# Patient Record
Sex: MALE | Race: WHITE | NOT HISPANIC OR LATINO | Employment: STUDENT | URBAN - METROPOLITAN AREA
[De-identification: names, ages, dates, MRNs, and addresses within clinical notes are randomized per-mention and may not be internally consistent; named-entity substitution may affect disease eponyms.]

---

## 2017-07-24 DIAGNOSIS — Z00.00 ROUTINE GENERAL MEDICAL EXAMINATION AT A HEALTH CARE FACILITY: Primary | ICD-10-CM

## 2017-08-09 ENCOUNTER — CLINICAL SUPPORT (OUTPATIENT)
Dept: INTERNAL MEDICINE | Facility: CLINIC | Age: 27
End: 2017-08-09
Attending: INTERNAL MEDICINE

## 2017-08-09 ENCOUNTER — HOSPITAL ENCOUNTER (OUTPATIENT)
Dept: CARDIOLOGY | Facility: CLINIC | Age: 27
Discharge: HOME | End: 2017-08-09

## 2017-08-09 ENCOUNTER — OFFICE VISIT (OUTPATIENT)
Dept: PULMONOLOGY | Facility: CLINIC | Age: 27
End: 2017-08-09

## 2017-08-09 ENCOUNTER — CLINICAL SUPPORT (OUTPATIENT)
Dept: INTERNAL MEDICINE | Facility: CLINIC | Age: 27
End: 2017-08-09

## 2017-08-09 ENCOUNTER — HOSPITAL ENCOUNTER (OUTPATIENT)
Dept: RADIOLOGY | Facility: HOSPITAL | Age: 27
Discharge: HOME | End: 2017-08-09
Attending: INTERNAL MEDICINE

## 2017-08-09 VITALS
SYSTOLIC BLOOD PRESSURE: 119 MMHG | WEIGHT: 250 LBS | BODY MASS INDEX: 33.13 KG/M2 | RESPIRATION RATE: 12 BRPM | DIASTOLIC BLOOD PRESSURE: 80 MMHG | HEART RATE: 76 BPM | HEIGHT: 73 IN

## 2017-08-09 DIAGNOSIS — Z00.00 ANNUAL PHYSICAL EXAM: Primary | ICD-10-CM

## 2017-08-09 DIAGNOSIS — Z00.00 ROUTINE GENERAL MEDICAL EXAMINATION AT A HEALTH CARE FACILITY: ICD-10-CM

## 2017-08-09 DIAGNOSIS — Z00.00 ROUTINE GENERAL MEDICAL EXAMINATION AT A HEALTH CARE FACILITY: Primary | ICD-10-CM

## 2017-08-09 LAB
ALBUMIN SERPL BCP-MCNC: 3.9 G/DL
ALP SERPL-CCNC: 81 U/L
ALT SERPL W/O P-5'-P-CCNC: 32 U/L
ANION GAP SERPL CALC-SCNC: 9 MMOL/L
AST SERPL-CCNC: 19 U/L
BILIRUB SERPL-MCNC: 1.1 MG/DL
BUN SERPL-MCNC: 15 MG/DL
CALCIUM SERPL-MCNC: 9.8 MG/DL
CHLORIDE SERPL-SCNC: 106 MMOL/L
CHOLEST/HDLC SERPL: 2.9 {RATIO}
CO2 SERPL-SCNC: 27 MMOL/L
CREAT SERPL-MCNC: 1 MG/DL
ERYTHROCYTE [DISTWIDTH] IN BLOOD BY AUTOMATED COUNT: 12.6 %
EST. GFR  (AFRICAN AMERICAN): >60 ML/MIN/1.73 M^2
EST. GFR  (NON AFRICAN AMERICAN): >60 ML/MIN/1.73 M^2
ESTIMATED AVG GLUCOSE: 100 MG/DL
GLUCOSE SERPL-MCNC: 88 MG/DL
HBA1C MFR BLD HPLC: 5.1 %
HCT VFR BLD AUTO: 43.7 %
HDL/CHOLESTEROL RATIO: 34.6 %
HDLC SERPL-MCNC: 162 MG/DL
HDLC SERPL-MCNC: 56 MG/DL
HGB BLD-MCNC: 14.7 G/DL
LDLC SERPL CALC-MCNC: 93.6 MG/DL
MCH RBC QN AUTO: 29.9 PG
MCHC RBC AUTO-ENTMCNC: 33.6 G/DL
MCV RBC AUTO: 89 FL
NONHDLC SERPL-MCNC: 106 MG/DL
PLATELET # BLD AUTO: 273 K/UL
PMV BLD AUTO: 10.5 FL
POTASSIUM SERPL-SCNC: 4.5 MMOL/L
PROT SERPL-MCNC: 7.2 G/DL
RBC # BLD AUTO: 4.91 M/UL
SODIUM SERPL-SCNC: 142 MMOL/L
TRIGL SERPL-MCNC: 62 MG/DL
WBC # BLD AUTO: 7.69 K/UL

## 2017-08-09 PROCEDURE — 71020 XR CHEST PA AND LATERAL: CPT | Mod: 26,,, | Performed by: RADIOLOGY

## 2017-08-09 PROCEDURE — 97802 MEDICAL NUTRITION INDIV IN: CPT | Mod: S$GLB,,, | Performed by: INTERNAL MEDICINE

## 2017-08-09 PROCEDURE — 93000 ELECTROCARDIOGRAM COMPLETE: CPT | Mod: ,,, | Performed by: INTERNAL MEDICINE

## 2017-08-09 PROCEDURE — 97750 PHYSICAL PERFORMANCE TEST: CPT | Mod: S$GLB,,, | Performed by: INTERNAL MEDICINE

## 2017-08-09 PROCEDURE — 99385 PREV VISIT NEW AGE 18-39: CPT | Mod: ,,, | Performed by: INTERNAL MEDICINE

## 2017-08-09 PROCEDURE — 71020 XR CHEST PA AND LATERAL: CPT | Mod: TC

## 2017-08-09 PROCEDURE — 80061 LIPID PANEL: CPT

## 2017-08-09 PROCEDURE — 36415 COLL VENOUS BLD VENIPUNCTURE: CPT

## 2017-08-09 PROCEDURE — 85027 COMPLETE CBC AUTOMATED: CPT

## 2017-08-09 PROCEDURE — 99999 PR PBB SHADOW E&M-EST. PATIENT-LVL III: CPT | Mod: PBBFAC,,, | Performed by: INTERNAL MEDICINE

## 2017-08-09 PROCEDURE — 83036 HEMOGLOBIN GLYCOSYLATED A1C: CPT

## 2017-08-09 PROCEDURE — 80053 COMPREHEN METABOLIC PANEL: CPT

## 2017-08-09 NOTE — PROGRESS NOTES
Subjective:       Patient ID: Bethel Rodrigues is a 27 y.o. male.    Chief Complaint: Annual Exam    HPI 26 yo dependent of a [employer] employee comes for his periodic health exam. He has recently graduated form [employer] in biology and is going out to find a job. He has no medical issues at this time.  Review of Systems   Constitutional: Negative.    HENT: Negative.    Eyes: Negative.    Respiratory: Negative.    Cardiovascular: Negative.    Gastrointestinal: Negative.    Genitourinary: Negative.    Musculoskeletal: Negative.    Skin: Negative.    Neurological: Negative.    Psychiatric/Behavioral: Negative.    All other systems reviewed and are negative.      Objective:      Physical Exam   Constitutional: He is oriented to person, place, and time. He appears well-developed and well-nourished.   HENT:   Head: Normocephalic and atraumatic.   Right Ear: External ear normal.   Left Ear: External ear normal.   Eyes: Conjunctivae and EOM are normal. Pupils are equal, round, and reactive to light.   Neck: Normal range of motion. Neck supple.   Cardiovascular: Normal rate, regular rhythm and normal heart sounds.    Pulmonary/Chest: Effort normal and breath sounds normal.   Peak flow 600 l/min   Abdominal: Soft. Bowel sounds are normal.   Musculoskeletal: Normal range of motion.   Neurological: He is alert and oriented to person, place, and time. He has normal reflexes.   Skin: Skin is warm and dry.   Psychiatric: He has a normal mood and affect. His behavior is normal. Judgment and thought content normal.       Assessment:       No diagnosis found.    Plan:         Labs: All parameters are normal. Chest x-ray is clear and EKG is normal. IMP: Healthy Male.

## 2017-08-09 NOTE — LETTER
August 9, 2017    Bethel Rodrigues  [address]  [place] [address]             [facility]  [address]  [place] [address]  Phone: [phone] Dear Bethel,      Thank you for allowing me to serve you and perform your Executive Health exam on 8/9/2017. This letter will serve as a brief summary of the physical findings and laboratory/studies performed and recommendations at this time. Today's assessment is normal except for your weight. You need to address that issue now. Good luck with the job search.         If you have any questions or concerns, please don't hesitate to call.    Sincerely,        Leonard Lopez MD

## 2017-08-09 NOTE — PROGRESS NOTES
Nutrition Assessment  Client name:  Bethel Rodrigues  :  1990  Age:  27 y.o.  Gender:  male    Client states:  His dad works for [employer] and his dad 's mom has history of elevated cholesterol. He recently graduated from [employer] in Biology, has moved back home with his Dad, took several vacations to celebrate and will begin his job search to work in a hospital lab or marine work--will wait and see what are the opportunities. He is a smoker fueled by study stress, but only has had 2 cigarettes of late, as stress level is quite less. He enjoys cooking and will research recipes on line and prepares pork chops, Italian entrees and ortiz. Presently he does not exercise, although has a gym membership, but would like to get into a exercise routine. When dining out he frequents [employer] bread, and [employer] for burritos. His goal for next year is to improve his cardio healthy and muscle strength.    No past medical history on file.    Social History    Marital status:  Single  Employment:  Graduated in Biology from [employer] - Dad works for [employer]    Social History   Substance Use Topics    Smoking status: Light Tobacco Smoker    Smokeless tobacco: Never Used    Alcohol use Yes      Comment: occasional, beer or whiskey 8 per wk        Current medications:  currently has no medications in their medication list.  Vitamins, minerals, and/or supplements:  Melatonin infrequently   Food allergies or intolerances:  none     Food History  Breakfast:  [employer] coffee 3c. With cream  Mid-morning Snack:  none  Lunch:  [place], sánchez, avocado sandwich from [employer] or meat/cheese sandwich on wheat bread  Mid-afternoon Snack:  none  Dinner:  Large Burrito with usual sides  H.S. Snack:  Cookies or brownies or 2nd portion of dinner foods    Exercise History:  No formal exercise, has gym memberships    Lab Reports   Total Cholesterol:  162    Triglycerides:  62  HDL:  56  LDL:  93.6   Glucose:   "88  HbA1c:  5.1  BP:  130/82     Weight History  Height:  6'1"     Weight:  250  BMI:  33.05  % Body Fat:  28.43    Diagnosis  RMR (Method:  Body McPherson):  2090 kcal  Activity Factor:  1.3  SHOAIB:  2717    Obesity related to inadequate physical activity as evidenced by BMI: 250 and Body fat 28.43%.    Intervention    Goals:  1.  Exercise begin 3x/wk, M/W/F advance to 5x/wk -- aerobic and resistance training 30 minutes each  2.  Healthy choices when dining out 25% - 50%  3.  Continue with healthy labs  4.  Body fat < 28.43  5.  Improve cardio and muscular health    Discussed the benefits of exercise to achieve stated goals. Explained options for healthy salty and sweet snacks to replace cookies. Discussed easy breakfast options, healthy selections for Fast Food and restaurant dining and the EAT Fit EMRE mobile sherrell and healthy shopping list to use as resources.    Handouts provided:  Meal Planning Guide  Restaurant Guide  Eat Fit Shopping List  Eat Fit Emre  Fast Food Guide  Vitamin/Mineral Guide    Monitoring/Evaluation    Monitor the following:  Weight  BMI  % Body Fat  Caloric intake  Labs:  CMP/Lipids    Follow Up Plan:  Follow up with client in 1-2 years  "

## 2017-08-10 NOTE — PROGRESS NOTES
Subjective:       Patient ID: Bethel Rodrigues is a 27 y.o. male.    Chief Complaint: No chief complaint on file.    HPI   Mr. Rodrigues has reported no previous history of cardiovascular or pulmonary disease. He has reported that he quit smoking 1 month ago. He has reported that his right shoulder was hurt during a skiing trip in January and the client requested that the push up functional test be deferred.    Mr. Rodrigues has just graduated with a degree in biology and is currently in the process of looking for a job.    Current Exercise Routine:   - None    Review of Systems    Objective:      The fitness evaluation results are as follows:    D.O.S. 8/9/2017   Height (in): 73   Weight (lbs): 250   BMI: 33.38575   Body Fat (%): 28.43   Waist (cm): 106   Hip (cm): 123   WHR: 0.86   RBP (mmHg): 130/82   RHR (bpm): 62    Strength R (lbs)t: 71.36306    Strength Lt (lbs): 61.52994   Push-up Assessment: Deferred   Curl-up Assessment: 29   Flexibility Testing (cm): 32   REE (kcals): 2090     Physical Exam    Assessment:      Age/Gender Stratified Assessment:     Resting BP: Within Testing Limits   Body Fat %: Poor   WHR Risk Factor: Low Risk    Strength R: Below Average    Strength L: Below Average   Upper Body Endurance: Deferred   Abdominal Endurance: Average   Lower body Flexibility: Good     1. Routine general medical examination at a health care facility        Plan:    Mr. Rodrigues should begin a regular exercise program now that time is available. He should focus on reaching below guidelines in order to increase fitness level. The routine should start out slow and gradually build over time.    Recommended Fitness Guidelines:   - 150 minutes of moderate intensity aerobic exercise each week   - 2-4 days per week with resistance training   - Daily stretching

## 2017-09-19 ENCOUNTER — OFFICE VISIT (OUTPATIENT)
Dept: ORTHOPEDICS | Facility: CLINIC | Age: 27
End: 2017-09-19
Payer: COMMERCIAL

## 2017-09-19 ENCOUNTER — HOSPITAL ENCOUNTER (OUTPATIENT)
Dept: RADIOLOGY | Facility: HOSPITAL | Age: 27
Discharge: HOME | End: 2017-09-19
Attending: PHYSICIAN ASSISTANT
Payer: COMMERCIAL

## 2017-09-19 VITALS
BODY MASS INDEX: 33.38 KG/M2 | SYSTOLIC BLOOD PRESSURE: 114 MMHG | DIASTOLIC BLOOD PRESSURE: 74 MMHG | WEIGHT: 251.88 LBS | HEIGHT: 73 IN | HEART RATE: 67 BPM

## 2017-09-19 DIAGNOSIS — R52 PAIN: ICD-10-CM

## 2017-09-19 DIAGNOSIS — M25.511 ACUTE PAIN OF RIGHT SHOULDER: ICD-10-CM

## 2017-09-19 DIAGNOSIS — R52 PAIN: Primary | ICD-10-CM

## 2017-09-19 PROCEDURE — 99999 PR PBB SHADOW E&M-EST. PATIENT-LVL III: CPT | Mod: PBBFAC,,, | Performed by: PHYSICIAN ASSISTANT

## 2017-09-19 PROCEDURE — 73030 X-RAY EXAM OF SHOULDER: CPT | Mod: 26,RT,, | Performed by: RADIOLOGY

## 2017-09-19 PROCEDURE — 3008F BODY MASS INDEX DOCD: CPT | Mod: S$GLB,,, | Performed by: PHYSICIAN ASSISTANT

## 2017-09-19 PROCEDURE — 73030 X-RAY EXAM OF SHOULDER: CPT | Mod: TC,RT

## 2017-09-19 PROCEDURE — 99203 OFFICE O/P NEW LOW 30 MIN: CPT | Mod: S$GLB,,, | Performed by: PHYSICIAN ASSISTANT

## 2017-09-19 RX ORDER — MELOXICAM 15 MG/1
15 TABLET ORAL DAILY
Qty: 30 TABLET | Refills: 1 | Status: SHIPPED | OUTPATIENT
Start: 2017-09-19 | End: 2017-09-19 | Stop reason: SDUPTHER

## 2017-09-19 RX ORDER — MELOXICAM 15 MG/1
15 TABLET ORAL DAILY
Qty: 30 TABLET | Refills: 1 | Status: SHIPPED | OUTPATIENT
Start: 2017-09-19 | End: 2017-10-19

## 2017-09-19 NOTE — PROGRESS NOTES
SUBJECTIVE:     Chief Complaint & History of Present Illness:  Bethel Rodrigues is a  New  patient 27 y.o. male who is seen here today with a complaint of    Chief Complaint   Patient presents with    Right Shoulder - Pain    .  He reports developed sudden onset pain in his right shoulder following a skiing accident about 3 months ago and landed forward onto his shoulder has any problems with soreness and pain in the anterior aspect of the shoulder since the time of injury.  Has treated conservatively since that time and received 75% relief.  But had an incident last week throwing a ball and has had a return of pain and sensations of instability in the shoulder  On a scale of 1-10, with 10 being worst pain imaginable, he rates this pain as 2 on good days and 5 on bad days.  he describes the pain as tender and sore.    Review of patient's allergies indicates:   Allergen Reactions    No known drug allergies          Current Outpatient Prescriptions   Medication Sig Dispense Refill    meloxicam (MOBIC) 15 MG tablet Take 1 tablet (15 mg total) by mouth once daily. 30 tablet 1     No current facility-administered medications for this visit.        History reviewed. No pertinent past medical history.    Past Surgical History:   Procedure Laterality Date    HERNIA REPAIR         Vital Signs (Most Recent)  Vitals:    09/19/17 1401   BP: 114/74   Pulse: 67       Review of Systems:  ROS:  Constitutional: no fever or chills  Eyes: no visual changes  ENT: no nasal congestion or sore throat  Respiratory: no cough or shortness of breath  Cardiovascular: no chest pain or palpitations  Gastrointestinal: no nausea or vomiting, tolerating diet  Genitourinary: no hematuria or dysuria  Integument/Breast: no rash or pruritis  Hematologic/Lymphatic: no easy bruising or lymphadenopathy  Musculoskeletal: no arthralgias or myalgias  Neurological: no seizures or tremors  Behavioral/Psych: no auditory or visual hallucinations  Endocrine: no  "heat or cold intolerance      OBJECTIVE:     PHYSICAL EXAM:  Height: 6' 1" (185.4 cm) Weight: 114.3 kg (251 lb 14 oz), General Appearance: Well nourished, well developed, in no acute distress.  Neurological: Mood & affect are normal.  Shoulder exam: right  Tenderness: AC joint, lateral acromial  ROM: forward flexion 180/180, extension 45/45, full abduction 180/180, abduction-glenohumeral 90/90, external rotation 50/50, pain at the extremes of mobility  Shoulder Strength: biceps 5/5, triceps 5/5, abduction 5/5, adduction 5/5, external rotation 5/5 with shoulder at side, flexion 5/5, and extension 5/5  negative for tenderness about the glenohumeral joint, positive for tenderness over the acromioclavicular joint and negative for impingement sign  Stability tests: anterior apprehension test positive for pain only and posterior apprehension test negative  Special Tests:Cross-chest abduction: negative                     RADIOGRAPHS:  X-rays taken today films reviewed by me demonstrate no ends of fracture dislocation or about the shoulder joint spaces are well-maintained no advanced generative joint disease    ASSESSMENT/PLAN:     Plan: We discussed with the patient at length all the different treatment options available for his rightshoulder including anti-inflammatories, acetaminophen, rest, ice, Physical therapy to include strengthening exercise, occasional cortisone injections for temporary relief, arthroscopic surgical repair, and finally shoulder arthroplasty.   He will into conservative treatment  Meloxicam 15 mg daily with food ×10 days followed by when necessary  Physical therapy for strength range of motion shoulder  Follow-up in 3 weeks sooner symptoms dictate    "

## 2017-09-25 ENCOUNTER — CLINICAL SUPPORT (OUTPATIENT)
Dept: REHABILITATION | Facility: HOSPITAL | Age: 27
End: 2017-09-25
Attending: PHYSICIAN ASSISTANT
Payer: COMMERCIAL

## 2017-09-25 DIAGNOSIS — R29.898 WEAKNESS OF RIGHT UPPER EXTREMITY: ICD-10-CM

## 2017-09-25 DIAGNOSIS — M25.511 ACUTE PAIN OF RIGHT SHOULDER: ICD-10-CM

## 2017-09-25 DIAGNOSIS — M25.311 INSTABILITY OF RIGHT SHOULDER JOINT: ICD-10-CM

## 2017-09-25 PROCEDURE — 97161 PT EVAL LOW COMPLEX 20 MIN: CPT | Mod: PO

## 2017-09-25 NOTE — PLAN OF CARE
OUTPATIENT PHYSICAL THERAPY  PHYSICAL THERAPY EVALUATION    Name: Bethel Rodrigues  Clinic Number: [id number]    Evaluation Date: 09/25/2017  Visit #: 1 / 20   Authorization period Expiration: 12/31/2017   Plan of Care Expiration: 12/26/2017  Precautions: Standard     Diagnosis:   Encounter Diagnoses   Name Primary?    Weakness of right upper extremity     Acute pain of right shoulder     Instability of right shoulder joint      Physician: Sky Huffman PA*  Treatment Orders: PT Eval and Treat  No past medical history on file.  Current Outpatient Prescriptions   Medication Sig    meloxicam (MOBIC) 15 MG tablet Take 1 tablet (15 mg total) by mouth once daily.     No current facility-administered medications for this visit.      Review of patient's allergies indicates:   Allergen Reactions    No known drug allergies        History   Prior Therapy: Pt reports no prior PT or shoulder injury, pulled muscle in back with no treatment    Social History: Pt lives with roommates, no limitations in recreational or home environment   Previous functional status: Prior to injury pt was independent in all ADLs and recreational activities   Current functional status: Pt currently has no functional limitations, only pain with end range abduction and ER of R UE   Work: Not employed     Subjective   History of Present Illness: Pt reports to [facility] with complaints of R shoulder pain sustained from a skiing incident in January 2016. Pt reports skiing on green slope when he fell forward onto his face and UE in a reverse T position. Pt had severe R shoulder pain and managed independently following incident. Pt stated shoulder pain resolved almost completely after initial injury. Approximately 1 week ago pt was playing fetch with his dog and felt similar, sharp pain in his R shoulder while in the initial throwing phase. Pt stated pain is mostly felt in the lateral shoulder while performing end range combined abduction with ER.  "Pt stated sensation of shoulder "sliding around" while performing overhead throwing or performing horizontal adduction. Pt. denies history of minor or major trauma, motor vehicle accident, fall; past or present cancer; fever, chills, night sweats; unexplained weight change in past 3 months; recent infection; persistent night pain; saddle anesthesia, or changes in bowel/bladder function.    DOI: January 2016, acute fare up about a week ago   Imaging, bone scan films: negative   Pain: current 0/10, worst 5/10, best 0/10, Sharp, intermittent  Radicular symptoms: none noted   Aggravating factors: end range abduction and ER  Easing factors: Moving arm into different position   Pts goals: Bethel would like to decreased shoulder pain and return to PLOF.     Objective   Mental status: alert, interactive, oriented x3  Posture/ Alignment: Pt is right hand dominant. In standing, pt presented with forward head, rounded shoulders, increased thoracic kyphosis, and internally rotated upper extremities.      Movemnet analysis: Pt demonstrated anterior translation of humeral head during IR and delayed upward rotation of scapula with R UE elevation.     ROM:     AROM Right Left Comment Normal   Shoulder Flexion: 150 degrees 145 degrees  180 deg   Shoulder Abduction: 156 degrees 160 degrees  180 deg   Shoulder ER: 98 degrees 103 degrees  90 deg   Shoulder IR: 90 degrees 92 degrees  90 deg   *denotes pain    Strength:      Right Left Comment   Shoulder flexion: 4+/5 4+/5    Shoulder Abduction: 5/5 5/5    Shoulder ER: 4+/5 4+/5    Shoulder IR: 5/5 5/5    Lower Trap: 3+/5 3+/5    Middle Trap: 3+/5 3+/5     5/5 5/5        Special Tests:  - Neers: right positive  - Shell-Db: right positive  - Lift-off: right negative  - Drop Arm: right negative  - Full/Empty Can: right negative  - Crank Test: right negative  - Apprehension/Relocation: right positive  - Scapular Assistance Test: right negative  - Scapular Retraction Test: right " negative   - Load and Shift Test: Right Positive, increased anterior translation   - Leon's: negative     Palpation:   No edema or erythema noted in shoulder girdle. Pt demonstrated increased tone in B upper trapezius. No tenderness with palpation of R glenohumeral joint line, greater tuberosity, RTC muscle bellies.     Joint Play:  Hypomobile R glenohumeral joint in AP direction   Hypermobile R glenohumeral joint in PA direction  Normal mobility in inferior direction when compared with unaffected L UE       Pt/family was provided educational information, including: shoulder anatomy and biomechanics, PT evaluation findings, role of PT, goals for PT, scheduling - pt verbalized understanding.     TREATMENT   Time In: 2:02 PM  Time Out: 3:00    Discussed Plan of Care with patient: Yes      Bethel received 5 minutes of therapeutic exercises including:   ER walkouts 10 x 5 second hold   Prone scap retractions 10 x 5 second hold   Prone B extension x 10       Written Home Exercises Provided: yes   Exercises were reviewed and Bethel was able to demonstrate them prior to the end of the session. Pt received a written copy of exercises to perform at home. Bethel demonstrated good  understanding of the education provided.     Assessment   Bethel is a 27 y.o. male referred to outpatient physical therapy with a medical diagnosis of acute R shoulder pain due to an exacerbation from a previous injury. Pt demonstrates impaired shoulder mechanics, laxity, and decreased neuromuscular control of R shoulder girdle. Bethel is a good candidate for skilled therapy services to improve appropriate humeral head translation, shoulder stability via muscular control, and strengthening to R UE so he may return to reactional and functional activity without symptoms. Pt prognosis is Good. Positive prognostic factors include motivation for therapy, health status, age. Negative prognostic factors include time elapsed since initial injury. Pt will  benefit from skilled outpatient physical therapy to address the above stated deficits, provide pt/family education, and to maximize pt's level of independence.     History  Co-morbidities and personal factors that may impact the plan of care Examination  Body Structures and Functions, activity limitations and participation restrictions that may impact the plan of care    Clinical Presentation   Co-morbidities:   high BMI        Personal Factors:   no deficits Body Regions:   upper extremities    Body Systems:   strength  motor control        Participation Restrictions:   Bethel currently is able to participate full in functional and recreational activity with symptoms.      Activity limitations:   Learning and applying knowledge  no deficits    General Tasks and Commands  no deficits    Communication  no deficits    Mobility  no deficits    Self care  no deficits    Domestic Life  no deficits    Interactions/Relationships  no deficits    Life Areas  no deficits    Community and Social Life  no deficits         stable and uncomplicated                      low   low  low Decision Making/ Complexity Score:  low     Pt's spiritual, cultural and educational needs considered and pt agreeable to plan of care and goals as stated below:     Anticipated Barriers for physical therapy: none     Short Term GOALS:  In 2 weeks, pt. Will:  1. Pt will demonstrate proper scapular stability with AROM of R UE within available range in order to prevent abnormal motor patterns  2. Throw 5 overhand pitches with </= 3/10 pain to increased tolerance for recreational activity   3. Pt will be able to demonstrate proper upright posture without verbal cuing in order to optimize functioning with decreased structural stresses      Long Term GOALS:  In 5 weeks, pt. Will:  1. Increased MMT for R low and mid trap by 1 grade to increase scapular stability with functional activities  2. Throw 15 overhand pitches without pain to return to recreational  activity.   3. Be independent with HEP and SX management     Plan   Outpatient physical therapy 2 times weekly to include: pt ed, HEP, therapeutic exercises, therapeutic activities, neuromuscular re-education/ balance exercises, manual therapy, and modalities prn. Cont PT for 5 weeks. Pt may be seen by PTA as part of the rehabilitation team.   Sky Huffman PA-C  I certify the need for these services furnished under this plan of treatment and while under my care.    Skylar Barbosa, PT

## 2017-10-02 ENCOUNTER — DOCUMENTATION ONLY (OUTPATIENT)
Dept: REHABILITATION | Facility: HOSPITAL | Age: 27
End: 2017-10-02

## 2017-10-02 NOTE — PROGRESS NOTES
Patient was not present for visit on 10/02/2017 scheduled at 2:00 PM. This is the patient's first no show appointment.

## 2017-10-04 ENCOUNTER — DOCUMENTATION ONLY (OUTPATIENT)
Dept: REHABILITATION | Facility: HOSPITAL | Age: 27
End: 2017-10-04

## 2017-10-04 ENCOUNTER — TELEPHONE (OUTPATIENT)
Dept: REHABILITATION | Facility: HOSPITAL | Age: 27
End: 2017-10-04

## 2017-10-09 ENCOUNTER — CLINICAL SUPPORT (OUTPATIENT)
Dept: REHABILITATION | Facility: HOSPITAL | Age: 27
End: 2017-10-09
Attending: PHYSICIAN ASSISTANT
Payer: COMMERCIAL

## 2017-10-09 DIAGNOSIS — R29.898 WEAKNESS OF RIGHT UPPER EXTREMITY: ICD-10-CM

## 2017-10-09 DIAGNOSIS — M25.511 ACUTE PAIN OF RIGHT SHOULDER: ICD-10-CM

## 2017-10-09 DIAGNOSIS — M25.311 INSTABILITY OF RIGHT SHOULDER JOINT: ICD-10-CM

## 2017-10-09 PROCEDURE — 97110 THERAPEUTIC EXERCISES: CPT | Mod: PO

## 2017-10-11 ENCOUNTER — CLINICAL SUPPORT (OUTPATIENT)
Dept: REHABILITATION | Facility: HOSPITAL | Age: 27
End: 2017-10-11
Attending: PHYSICIAN ASSISTANT
Payer: COMMERCIAL

## 2017-10-11 DIAGNOSIS — M25.311 INSTABILITY OF RIGHT SHOULDER JOINT: ICD-10-CM

## 2017-10-11 DIAGNOSIS — R29.898 WEAKNESS OF RIGHT UPPER EXTREMITY: ICD-10-CM

## 2017-10-11 DIAGNOSIS — M25.511 ACUTE PAIN OF RIGHT SHOULDER: ICD-10-CM

## 2017-10-11 PROCEDURE — 97110 THERAPEUTIC EXERCISES: CPT | Mod: PO

## 2017-10-11 NOTE — PROGRESS NOTES
Name: Bethel Rodrigues  Clinic Number: [id number]  Date of Treatment: 10/11/2017   Diagnosis:   Encounter Diagnoses   Name Primary?    Weakness of right upper extremity     Acute pain of right shoulder     Instability of right shoulder joint        Physician: Sky Huffman PA*    Time in: 2:05 PM  Time Out: 2:55 PM  Total Treatment Time: 50  Last PN: NA  Date of eval: 09/25/2017  Visit #: 3/20  Auth expiration: 12/31/2017  POC expiration: 12/26/2017    Precautions: Standard     Subjective     Bethel reports his shoulder is acting up more than usual today. Pt stated no residual soreness after last treatment session, reports feeling better following. Pt reports their pain to be 0/10 on a 0-10 scale with 0 being no pain and 10 being the worst pain imaginable.    Objective     Bethel received therapeutic exercises to develop strength and endurance for 50 minutes including:     UBE x 6 minutes, switch direction every 2 minutes     Sidelying ER  1#  3x10  Sidelying Abd  2# 3 x10     Prone scap retraction 2 x 10, 5 second hold    Prone row 2 x 10 2# with scapular setting   Prone I Y T  X 10 with scapular retraction     Wall walks YTB 10' x 2 laps   Wall slides with YTB 2 x 15  Wall climbs  OTB x 15    Standing Scapula Retractions GTB 2 x 15  ER/IR Walkouts 2 x 10 with 5 second hold GTB    Ball on wall stabilization - CW/CCW, Up/down, M/L 2 x 30 seconds each     (next visit: D1 pattern with pulley)     Written Home Exercises Provided: No, pt instructed to continue with current HEP    Pt educated on new therapeutic exercies. Pt demo good understanding of the education provided. Bethel demonstrated good return demonstration of activities.     Assessment   Pt able to perform new therex without increase in symptoms. However, weight reduced with sidelying ER exercise due to complaints of sharp pain in anterior shoulder. Pt demonstrated muscle fatigue with stabilization exercises and wall climbs. Pt able to perform appropriate  scapular setting with minimal cueing prior to prone IYTs. Bethel participated in today's treatment session without symptom provocation or adverse effects. Pt will continue to benefit from skilled PT intervention. Medical Necessity is demonstrated by:  Pain limits function of effected part for some activities and Weakness.    Patient is making good progress towards established goals.    New/Revised goals: none at this time  Plan   Continue with established Plan of Care towards PT goals.     Skylar Barbosa, PT

## 2017-10-18 ENCOUNTER — CLINICAL SUPPORT (OUTPATIENT)
Dept: REHABILITATION | Facility: HOSPITAL | Age: 27
End: 2017-10-18
Attending: PHYSICIAN ASSISTANT
Payer: COMMERCIAL

## 2017-10-18 DIAGNOSIS — M25.511 ACUTE PAIN OF RIGHT SHOULDER: ICD-10-CM

## 2017-10-18 DIAGNOSIS — M25.311 INSTABILITY OF RIGHT SHOULDER JOINT: ICD-10-CM

## 2017-10-18 DIAGNOSIS — R29.898 WEAKNESS OF RIGHT UPPER EXTREMITY: ICD-10-CM

## 2017-10-18 PROCEDURE — 97110 THERAPEUTIC EXERCISES: CPT | Mod: PO

## 2017-10-18 PROCEDURE — 97140 MANUAL THERAPY 1/> REGIONS: CPT | Mod: PO

## 2017-10-18 NOTE — PROGRESS NOTES
Name: Bethel Rodrigues  Clinic Number: [id number]  Date of Treatment: 10/18/2017   Diagnosis:   Encounter Diagnoses   Name Primary?    Weakness of right upper extremity     Acute pain of right shoulder     Instability of right shoulder joint        Physician: Sky Huffman PA*    Time in: 2:00 PM  Time Out: 3:00 PM  Total Treatment Time: 60 minutes   Last PN: NA  Date of eval: 09/25/2017  Visit #: 4/20  Auth expiration: 12/31/2017  POC expiration: 12/26/2017    Precautions: Standard     Subjective     Bethel reports his shoulder is shoulder is feeling good today. Pt stated last week he was sore after treatment session, but it was symptoms resolved the following day. Pt stated should began to feel stronger last week, but occasionally experiences sensations of shoulder slipping forward while performing reaching activities. Pt reports their pain to be 0/10 on a 0-10 scale with 0 being no pain and 10 being the worst pain imaginable.    Objective     Bethel received therapeutic exercises to develop strength and endurance for 50 minutes including:     UBE x 6 minutes, switch direction every 2 minutes Level 1.5 resistance     Sidelying ER  2#  3x10  Sidelying Abd  2# 3 x10   Sidelying Shoulder Flexion 2# 2 x 10     Prone scap retraction 2 x 10, 5 second hold    Prone row 2 x 10 2# with scapular setting   Prone I Y T  X 10 with scapular retraction     Wall walks YTB 10' x 2 laps   Wall slides with OTB x10   Wall slides with OTB and lift at end x 10     Wall climbs  OTB x 15    Standing Scapula Retractions GTB 2 x 15  ER/IR Walkouts 2 x 10 with 5 second hold GTB    Ball on wall stabilization - CW/CCW, Up/down, M/L 2 x 30 seconds each     Sleeper Stretch 3 x 20 seconds   Scapular Wall Slides with stable trunk     (next visit: D1 pattern with pulley, sharapova)     Bethel received the following manual therapy techniques: Joint mobilizations and Soft tissue Mobilization were applied to the: R shoulder for 10 minutes.   Gr I  - II AP mobilizations to GH joint   IR stretch with posterior force through [place]    Written Home Exercises Provided: No, pt instructed to continue with current HEP    Pt educated on new therapeutic exercies. Pt demo good understanding of the education provided. Bethel demonstrated good return demonstration of activities.     Assessment   Pt demonstrating improved awareness of periscapular musculature and activation patterns. Pt able to tolerate progression of exercises including increased resistance and repetitions. Pt required tactile and verbal cueing to decrease shoulder girdle elevation and maintain parallel forearms during wall slides with lift offs. Bethel participated in today's treatment session without symptom provocation or adverse effects. Pt will continue to benefit from skilled PT intervention. Medical Necessity is demonstrated by:  Pain limits function of effected part for some activities and Weakness.    Patient is making good progress towards established goals.    New/Revised goals: none at this time  Plan   Continue with established Plan of Care towards PT goals.     Skylar Barbosa, PT

## 2017-10-23 ENCOUNTER — CLINICAL SUPPORT (OUTPATIENT)
Dept: REHABILITATION | Facility: HOSPITAL | Age: 27
End: 2017-10-23
Attending: PHYSICIAN ASSISTANT
Payer: COMMERCIAL

## 2017-10-23 DIAGNOSIS — M25.511 ACUTE PAIN OF RIGHT SHOULDER: ICD-10-CM

## 2017-10-23 DIAGNOSIS — M25.311 INSTABILITY OF RIGHT SHOULDER JOINT: ICD-10-CM

## 2017-10-23 DIAGNOSIS — R29.898 WEAKNESS OF RIGHT UPPER EXTREMITY: ICD-10-CM

## 2017-10-23 PROCEDURE — 97110 THERAPEUTIC EXERCISES: CPT | Mod: PO

## 2017-10-23 NOTE — PROGRESS NOTES
Name: Bethel Rodrigues  Clinic Number: [id number]  Date of Treatment: 10/23/2017   Diagnosis:   Encounter Diagnoses   Name Primary?    Weakness of right upper extremity     Acute pain of right shoulder     Instability of right shoulder joint        Physician: Sky Huffman PA*    Time in: 2:00 PM  Time Out: 2:50 PM  Total Treatment Time: 50 minutes   Last PN: NA  Date of eval: 09/25/2017  Visit #: 5/20  Auth expiration: 12/31/2017  POC expiration: 12/26/2017    Precautions: Standard     Subjective     Bethel reports his shoulder is shoulder is feeling good today. Pt denied any adverse effects following last treatment session.  Pt reports their pain to be 0/10 on a 0-10 scale with 0 being no pain and 10 being the worst pain imaginable.    Objective     Bethel received therapeutic exercises to develop strength and endurance for 50 minutes including:     UBE x 6 minutes, switch direction every 2 minutes Level 1.5 resistance     Sidelying ER  2#  3x10  Sidelying Abd  2# 3 x10   Sidelying Shoulder Flexion 2#  3 x 10     Prone scap retraction 2 x 10, 5 second hold  (NP)  Prone row 2 x 10 2# with scapular setting   Prone I Y T  X 15 with scapular retraction 1#    Wall walks OTB 10' x 2 laps   Wall slides with OTB x10 (NP)  Wall slides with OTB and lift at end x 15  Wall climbs  OTB x 15    Standing Scapula Retractions on cable cross 7# 2 x 10   ER/IR Walkouts 2 x 10 with 5 second hold GTB (NP)  D1 pattern on pulley 7# 2 x 10 with R LE step back   ER on cable cross 3# 2 x 10     Ball on wall stabilization - CW/CCW, Up/down, M/L 2 x 30 seconds each (NP)  Body Blade ML & AP 2 x 30 seconds   Overhead ball toss on rebounder with red ball x 20     Sleeper Stretch 3 x 20 seconds   Scapular Clocks with towel x 10     (next visit: standing punches at cable cross, shoulder extension)     Bethel received the following manual therapy techniques: Joint mobilizations and Soft tissue Mobilization were applied to the: R shoulder for 0  minutes. (NOT PERFORMED TODAY)   Gr I - II AP mobilizations to GH joint   IR stretch with posterior force through [place]    Written Home Exercises Provided: No, pt instructed to continue with current HEP    Pt educated on new therapeutic exercies. Pt demo good understanding of the education provided. Bethel demonstrated good return demonstration of activities.     Assessment   Pt able to tolerate progression of exercises to include increased resistance and activation of periscapular musculature. Pt able to complete overhead ball toss on rebounder without symptoms provocation. Pt required cueing for increased lower trapezius activation during wall lift offs. Pt tolerated body blade with minimal verbal cueing for technique and form. Bethel participated in today's treatment session without symptom provocation or adverse effects. Pt will continue to benefit from skilled PT intervention. Medical Necessity is demonstrated by:  Pain limits function of effected part for some activities and Weakness.    Patient is making good progress towards established goals.    New/Revised goals: none at this time  Plan   Continue with established Plan of Care towards PT goals. Re-evaluate next treatment session.     Skylar Barbosa, PT

## 2017-10-25 ENCOUNTER — CLINICAL SUPPORT (OUTPATIENT)
Dept: REHABILITATION | Facility: HOSPITAL | Age: 27
End: 2017-10-25
Attending: PHYSICIAN ASSISTANT
Payer: COMMERCIAL

## 2017-10-25 DIAGNOSIS — M25.511 ACUTE PAIN OF RIGHT SHOULDER: ICD-10-CM

## 2017-10-25 DIAGNOSIS — M25.311 INSTABILITY OF RIGHT SHOULDER JOINT: ICD-10-CM

## 2017-10-25 DIAGNOSIS — R29.898 WEAKNESS OF RIGHT UPPER EXTREMITY: ICD-10-CM

## 2017-10-25 PROCEDURE — 97164 PT RE-EVAL EST PLAN CARE: CPT | Mod: PO

## 2017-10-25 PROCEDURE — 97110 THERAPEUTIC EXERCISES: CPT | Mod: PO

## 2017-10-25 NOTE — PROGRESS NOTES
Name: Bethel Rodrigues  Clinic Number: [id number]  Date of Treatment: 10/25/2017   Diagnosis:   Encounter Diagnoses   Name Primary?    Weakness of right upper extremity     Acute pain of right shoulder     Instability of right shoulder joint        Physician: Sky Huffman PA*    Time in: 2:05 PM  Time Out: 3:05 PM  Total Treatment Time: 60 minutes   Last PN: NA  Date of eval: 09/25/2017  Visit #: 6/20  Auth expiration: 12/31/2017  POC expiration: 12/26/2017    Precautions: Standard     Subjective     Pt reports feeling more comfortable with overhead throwing motions with increased force. Pt reports occasional sharp pain in anterior lateral shoulder while performing activities involving reaching forward and down diagonally.  Pt reports their pain to be 0/10 on a 0-10 scale with 0 being no pain and 10 being the worst pain imaginable.    Objective     Strength:        Right Left Comment   Shoulder flexion: 5/5 4+/5     Shoulder Abduction: 5/5 5/5     Shoulder ER: 4+/5 NT     Shoulder IR: 5/5 NT     Lower Trap: 4/5 NT     Middle Trap: 4/5 NT      5/5 5/5           Special Tests:  - Neers: right negative  - Shell-Db: right negative  - Biceps Load I/II: right negative  - Crank Test: right positive  - Apprehension/Relocation: right negative     Palpation:   No TTP      Joint Play:  Normal mobility of R GH joint AP direction   Increased anterior translation with IR at 90 degrees abduction   Hypermobility in R GH joint in PA direction       Bethel received therapeutic exercises to develop strength and endurance for 45 minutes including:     UBE x 6 minutes, switch direction every 2 minutes Level 2 resistance     Sidelying ER  2#  3x10 (NP today)  Sidelying Abd  2# 3 x10 (NP today)  Sidelying Shoulder Flexion 2#  3 x 10 (NP today)    Prone scap retraction 2 x 10, 5 second hold  (NP)  Prone row 2 x 10 2# with scapular setting (NP today)  Prone I Y T  X 15 with scapular retraction 1#    Wall walks OTB 10' x 2  laps   Wall slides with OTB x10 (NP)  Wall slides with OTB and lift at end 2 x 10   Wall climbs  OTB x 15    Standing Scapula Retractions on cable cross 7# 3 x 10   ER/IR Walkouts 2 x 10 with 5 second hold GTB (NP)  D1 pattern on pulley 7# 3 x 10 with R LE step back   Resisted D2 flexion on cable cross 3# x 15   ER on cable cross 3#  3 x 10     Ball on wall stabilization - CW/CCW, Up/down, M/L 2 x 30 seconds each (NP)  Body Blade ML & AP 2 x 30 seconds   Overhead ball toss on rebounder with red ball x 20   Overhead ball toss on rebounder on airex x 20   Overhead wall dribbles green ball 3 x 30 seconds     Sleeper Stretch 3 x 20 seconds (NP)  Scapular Clocks with towel x 10     (next visit: 90/90 ER/IR)    Bethel received the following manual therapy techniques: Joint mobilizations and Soft tissue Mobilization were applied to the: R shoulder for 0 minutes. (NOT PERFORMED TODAY)   Gr I - II AP mobilizations to GH joint   IR stretch with posterior force through [place]    Written Home Exercises Provided: No, pt instructed to continue with current HEP    Pt educated on new therapeutic exercies. Pt demo good understanding of the education provided. Bethel demonstrated good return demonstration of activities.     Assessment   Pt demonstrates improved control and activation of periscapular musculature since initial evaluation. Pt able to tolerate progression of exercises to include increase control of periscapular muscles during overhead activity. Pt demonstrates improved activity tolerance for overhead activity. Pt tolerated addition of new exercises to include simultaneous TrA activation with RTC activation. Pt with muscle fatigue following overhead wall dribbles.  [place] participated in today's treatment session without symptom provocation or adverse effects. Pt will continue to benefit from skilled PT intervention. Medical Necessity is demonstrated by:  Pain limits function of effected part for some activities and  Weakness.    Patient is making good progress towards established goals.    Short Term GOALS:  In 2 weeks, pt. Will:  1. Pt will demonstrate proper scapular stability with AROM of R UE within available range in order to prevent abnormal motor patterns (in progress)   2. Throw 5 overhand pitches with </= 3/10 pain to increased tolerance for recreational activity  (MET 10/25)  3. Pt will be able to demonstrate proper upright posture without verbal cuing in order to optimize functioning with decreased structural stresses (MET 10/25)        Long Term GOALS:  In 5 weeks, pt. Will:  1. Increased MMT for R low and mid trap by 1 grade to increase scapular stability with functional activities  2. Throw 15 overhand pitches without pain to return to recreational activity. (MET 10/25)  3. Be independent with HEP and SX management   Plan   Continue with established Plan of Care towards PT goals.    Skylar Barbosa, PT

## 2017-10-30 ENCOUNTER — CLINICAL SUPPORT (OUTPATIENT)
Dept: REHABILITATION | Facility: HOSPITAL | Age: 27
End: 2017-10-30
Attending: PHYSICIAN ASSISTANT
Payer: COMMERCIAL

## 2017-10-30 DIAGNOSIS — R29.898 WEAKNESS OF RIGHT UPPER EXTREMITY: ICD-10-CM

## 2017-10-30 DIAGNOSIS — M25.311 INSTABILITY OF RIGHT SHOULDER JOINT: ICD-10-CM

## 2017-10-30 DIAGNOSIS — M25.511 ACUTE PAIN OF RIGHT SHOULDER: ICD-10-CM

## 2017-10-30 PROCEDURE — 97110 THERAPEUTIC EXERCISES: CPT | Mod: PO

## 2017-10-30 NOTE — PROGRESS NOTES
Name: Bethel Rodrigues  Clinic Number: [id number]  Date of Treatment: 10/30/2017   Diagnosis:   Encounter Diagnoses   Name Primary?    Weakness of right upper extremity     Acute pain of right shoulder     Instability of right shoulder joint        Physician: Sky Huffman PA*    Time in: 2:10 PM  Time Out: 2:55 PM PM  Total Treatment Time: 45 minutes   Last PN: NA  Date of eval: 09/25/2017  Visit #: 7/20  Auth expiration: 12/31/2017  POC expiration: 12/26/2017    Precautions: Standard     Subjective     Pt reports this past weekend is the best he has had since beginning therapy. Pt reports only one episode of minor pain. Pt reports their pain to be 0/10 on a 0-10 scale with 0 being no pain and 10 being the worst pain imaginable.    Objective     Bethel received therapeutic exercises to develop strength and endurance for 45 minutes including:     UBE x 6 minutes, switch direction every 2 minutes Level 2 resistance     Wall walks OTB 10' x 2 laps   Wall slides with OTB and lift at end 2 x 10   Wall climbs  OTB x 15  Ball walks with green theraball on wall x 3 laps      Standing Scapula Retractions on cable cross 7# 3 x 10   D1 pattern on pulley 7# 3 x 10 with R LE step back   Resisted D2 flexion on cable cross 3# 2 x 15   ER on cable cross 3#  3 x 10   90/90 IR and ER 3# at cable cross    Body Blade ML & AP 2 x 30 seconds with elbow extended   Overhead ball toss on rebounder on airex tandem x 20 each direction  Overhead wall dribbles green ball 3 x 30 seconds     NOT PERFORMED - ADDED TO HEP  Sidelying ER  2#  3x10 (NP today)  Sidelying Shoulder Flexion 2#  3 x 10 (NP today)  Prone scap retraction 2 x 10, 5 second hold  (NP)  Prone row 2 x 10 2# with scapular setting (NP today)  Prone I Y T  X 15 with scapular retraction 1#    (next visit: push ups on bosu)    Bethel received the following manual therapy techniques: Joint mobilizations and Soft tissue Mobilization were applied to the: R shoulder for 0 minutes.  (NOT PERFORMED TODAY)   Gr I - II AP mobilizations to GH joint   IR stretch with posterior force through [place]    Written Home Exercises Provided: Yes, including exercises listed above.     Pt educated on new therapeutic exercies. Pt demo good understanding of the education provided. Bethel demonstrated good return demonstration of activities.     Assessment   Pt able to tolerate progression of exercises to include increased demand on RTC force couple activation while overhead. Pt with pain in R shoulder during 90/90 ER that subsided with continuation of activity. Pt unable to progress to prone 90/90 ER due to weakness; pt unable to perform exercise through full range of motion. Pt required prolonged rest break between wall dribble exercise. Pt provided with updated HEP to allow for independent management of symptoms and allow for increase functional exercise during treatment sessions. Bethel participated in today's treatment session without symptom provocation or adverse effects.  Pt will continue to benefit from skilled PT intervention. Medical Necessity is demonstrated by:  Pain limits function of effected part for some activities and Weakness.    Patient is making good progress towards established goals.    Short Term GOALS:  In 2 weeks, pt. Will:  1. Pt will demonstrate proper scapular stability with AROM of R UE within available range in order to prevent abnormal motor patterns (in progress)   2. Throw 5 overhand pitches with </= 3/10 pain to increased tolerance for recreational activity  (MET 10/25)  3. Pt will be able to demonstrate proper upright posture without verbal cuing in order to optimize functioning with decreased structural stresses (MET 10/25)        Long Term GOALS:  In 5 weeks, pt. Will:  1. Increased MMT for R low and mid trap by 1 grade to increase scapular stability with functional activities  2. Throw 15 overhand pitches without pain to return to recreational activity. (MET 10/25)  3. Be  independent with HEP and SX management     Plan   Continue with established Plan of Care towards PT goals. Start D/C planning, decreasing frequency.     Skylar Barbosa, PT

## 2017-11-01 ENCOUNTER — CLINICAL SUPPORT (OUTPATIENT)
Dept: REHABILITATION | Facility: HOSPITAL | Age: 27
End: 2017-11-01
Attending: PHYSICIAN ASSISTANT
Payer: COMMERCIAL

## 2017-11-01 DIAGNOSIS — M25.511 ACUTE PAIN OF RIGHT SHOULDER: ICD-10-CM

## 2017-11-01 DIAGNOSIS — M25.311 INSTABILITY OF RIGHT SHOULDER JOINT: ICD-10-CM

## 2017-11-01 DIAGNOSIS — R29.898 WEAKNESS OF RIGHT UPPER EXTREMITY: ICD-10-CM

## 2017-11-01 PROCEDURE — 97110 THERAPEUTIC EXERCISES: CPT | Mod: PO

## 2017-11-01 NOTE — PROGRESS NOTES
Name: Bethel Rodrigues  Clinic Number: [id number]  Date of Treatment: 11/01/2017   Diagnosis:   Encounter Diagnoses   Name Primary?    Weakness of right upper extremity     Acute pain of right shoulder     Instability of right shoulder joint        Physician: Sky Huffman PA*    Time in: 2:00 PM  Time Out: 2:55 PM   Total Treatment Time: 55 minutes   Last PN: NA  Date of eval: 09/25/2017  Visit #: 8/20  Auth expiration: 12/31/2017  POC expiration: 12/26/2017    Precautions: Standard     Subjective     Pt reports shoulder is feeling good today and he was not as sore as he thought he would be after last session. Pt reports non-compliance with new HEP issued last treatment. Pt reports their pain to be 0/10 on a 0-10 scale with 0 being no pain and 10 being the worst pain imaginable.    Objective     Bethel received therapeutic exercises to develop strength and endurance for 55 minutes including:     UBE x 6 minutes, switch direction every 2 minutes Level 2 resistance     Wall walks OTB 10' x 2 laps   Wall slides with OTB and lift at end 2 x 10   Wall climbs  OTB x 15  Ball walks with green theraball on wall x 3 laps      Standing Scapula Retractions on cable cross 7# 3 x 10   D1 pattern on pulley 7# 3 x 10 with R LE step back   Resisted D2 flexion on cable cross 3# 2 x 15   ER on cable cross 3#  3 x 10   90/90 IR and ER 3# at cable cross    Body Blade ML & AP 2 x 30 seconds with elbow extended   Overhead ball toss on rebounder on airex tandem x 20 each direction  Overhead wall dribbles green ball 3 x 30 seconds     Sidelying ER  2#  3x10  Sidelying Shoulder Flexion 2#  3 x 10     Push up push off on counter x 10       NOT PERFORMED - ADDED TO HEP  Prone scap retraction 2 x 10, 5 second hold  (NP)  Prone row 2 x 10 2# with scapular setting (NP today)  Prone I Y T  X 15 with scapular retraction 1#      Bethel received the following manual therapy techniques: Joint mobilizations and Soft tissue Mobilization were  applied to the: R shoulder for 0 minutes. (NOT PERFORMED TODAY)   Gr I - II AP mobilizations to GH joint   IR stretch with posterior force through [place]    Written Home Exercises Provided: Yes, including exercises listed above.     Pt educated on new therapeutic exercies. Pt demo good understanding of the education provided. Bethel demonstrated good return demonstration of activities.     Assessment   Pt able to complete treatment session with muscle fatigue following therex. Pt demonstrated increased lumbar lordosis and anterior pelvic tilt as compensation for shoulder extension in wall lift off exercise. Pt corrected compensation with cueing and decreased reps. Bethel participated in today's treatment session without symptom provocation or adverse effects.  Pt will continue to benefit from skilled PT intervention. Medical Necessity is demonstrated by:  Pain limits function of effected part for some activities and Weakness.    Patient is making good progress towards established goals.    Short Term GOALS:  In 2 weeks, pt. Will:  1. Pt will demonstrate proper scapular stability with AROM of R UE within available range in order to prevent abnormal motor patterns (in progress)   2. Throw 5 overhand pitches with </= 3/10 pain to increased tolerance for recreational activity  (MET 10/25)  3. Pt will be able to demonstrate proper upright posture without verbal cuing in order to optimize functioning with decreased structural stresses (MET 10/25)        Long Term GOALS:  In 5 weeks, pt. Will:  1. Increased MMT for R low and mid trap by 1 grade to increase scapular stability with functional activities  2. Throw 15 overhand pitches without pain to return to recreational activity. (MET 10/25)  3. Be independent with HEP and SX management     Plan   Continue with established Plan of Care towards PT goals. Start D/C planning, decreasing frequency.     Skylar Barbosa, PT

## 2017-12-26 ENCOUNTER — DOCUMENTATION ONLY (OUTPATIENT)
Dept: REHABILITATION | Facility: HOSPITAL | Age: 27
End: 2017-12-26

## 2017-12-26 NOTE — PROGRESS NOTES
PHYSICAL THERAPY DISCHARGE SUMMARY     Name: Bethel Rodrigues  Clinic Number: [id number]    Diagnosis:   Encounter Diagnoses   Name Primary?    Weakness of right upper extremity      Acute pain of right shoulder      Instability of right shoulder joint           Physician: Sky Huffman PA*    No past medical history on file.    Initial visit: 09/25/2017  Date of Last visit: 11/01/2017  Date of Discharge Note:  12/26/2017  Total Visits Received: 8  Missed Visits: 4  ASSESSMENT   Status Towards Goals Met:  Pt met most short term goals involving posture correction, overhead throwing, and subjective pain reports.     Goals Not achieved and why: Pt has not met all long term goals in regards to strength and scapulohumeral stabilization.  Pt has not scheduled any additional visits and has not returned to therapy.     Discharge reason : Patient self discharge      PLAN   This patient is discharged from Physical Therapy Services.       Skylar Barbosa, PT  12/26/2017

## 2018-04-16 ENCOUNTER — OFFICE VISIT (OUTPATIENT)
Dept: UROLOGY | Facility: CLINIC | Age: 28
End: 2018-04-16
Payer: COMMERCIAL

## 2018-04-16 ENCOUNTER — HOSPITAL ENCOUNTER (OUTPATIENT)
Dept: RADIOLOGY | Facility: HOSPITAL | Age: 28
Discharge: HOME | End: 2018-04-16
Attending: UROLOGY
Payer: COMMERCIAL

## 2018-04-16 ENCOUNTER — TELEPHONE (OUTPATIENT)
Dept: UROLOGY | Facility: CLINIC | Age: 28
End: 2018-04-16

## 2018-04-16 VITALS
BODY MASS INDEX: 30.09 KG/M2 | SYSTOLIC BLOOD PRESSURE: 134 MMHG | WEIGHT: 227 LBS | HEART RATE: 88 BPM | HEIGHT: 73 IN | DIASTOLIC BLOOD PRESSURE: 75 MMHG

## 2018-04-16 DIAGNOSIS — N50.89 TESTICULAR MASS: ICD-10-CM

## 2018-04-16 DIAGNOSIS — N50.89 TESTICULAR MASS: Primary | ICD-10-CM

## 2018-04-16 PROCEDURE — 99204 OFFICE O/P NEW MOD 45 MIN: CPT | Mod: 57,S$GLB,, | Performed by: UROLOGY

## 2018-04-16 PROCEDURE — 76870 US EXAM SCROTUM: CPT | Mod: 26,,, | Performed by: RADIOLOGY

## 2018-04-16 PROCEDURE — 99999 PR PBB SHADOW E&M-EST. PATIENT-LVL III: CPT | Mod: PBBFAC,,, | Performed by: UROLOGY

## 2018-04-16 PROCEDURE — 76870 US EXAM SCROTUM: CPT | Mod: TC

## 2018-04-16 NOTE — PROGRESS NOTES
Subjective:       Patient ID: Bethel Rodrigues is a 27 y.o. male.    Chief Complaint:  Testicular Mass      History of Present Illness  HPI  Patient is a 27 y.o. male who is new to our clinic and self-referred for evaluation of a testicular mass.  Noted a right testicular mass while washing last week. No pain.  No skin changes. Slight discomfort in lower abdomen.  No issues urinating.    Works at [employer].  No fh of testicular cancer.       Review of Systems  Review of Systems  All other systems reviewed and negative except pertinent positives noted in HPI.       Objective:     Physical Exam   Nursing note and vitals reviewed.  Constitutional: He is oriented to person, place, and time. Vital signs are normal. He appears well-developed and well-nourished. He is cooperative.   HENT:   Head: Normocephalic.   Neck: No tracheal deviation present.   Cardiovascular: Normal rate and intact distal pulses.    Pulmonary/Chest: Effort normal. No accessory muscle usage. No tachypnea. No respiratory distress.   Abdominal: Soft. Normal appearance. He exhibits no distension, no fluid wave, no ascites and no mass. There is no tenderness. There is no rebound and no CVA tenderness. No hernia. Hernia confirmed negative in the right inguinal area and confirmed negative in the left inguinal area.   Liver/spleen non-palpable.   Genitourinary: Prostate normal and penis normal. Rectal exam shows no external hemorrhoid, no mass, no tenderness and anal tone normal. Prostate is not tender. Right testis shows mass and tenderness (mild). Right testis is descended. Left testis shows no mass and no tenderness. Left testis is descended. Circumcised.   Genitourinary Comments: Scrotum showed no rashes or lesions.  Anus/perineum without lesion.  Epididymis showed no masses or tenderness. No meatal stenosis, meatus in normal location. No penile discharge/plaques.        Musculoskeletal: Normal range of motion.   Lymphadenopathy: No inguinal  adenopathy noted on the right or left side.        Right: No inguinal adenopathy present.        Left: No inguinal adenopathy present.   Neurological: He is alert and oriented to person, place, and time. He has normal strength.   Skin: No bruising and no rash noted.     Psychiatric: He has a normal mood and affect. His speech is normal and behavior is normal. Thought content normal.       Lab Review  No results found for: COLORU, SPECGRAV, PHUR, WBCUR, NITRITE, PROTEINUR, GLUCOSEUR, KETONESU, UROBILINOGEN, BILIRUBINUR, RBCUR      Assessment:        1. Testicular mass           Plan:     Testicular mass      -Ultrasound was independently reviewed today and reveals a right testicular mass 2.1cm.  -I have explained the indication, risks, benefits, and alternatives of the procedure in detail.  The patient voices understanding and all questions have been answered.  The patient agrees to proceed as planned with right radical orchiectomy.  -tumor markers  -cxr  -CT a/p for retroperitoneal node staging.

## 2018-04-17 ENCOUNTER — ANESTHESIA EVENT (OUTPATIENT)
Dept: SURGERY | Facility: HOSPITAL | Age: 28
End: 2018-04-17
Payer: COMMERCIAL

## 2018-04-17 ENCOUNTER — HOSPITAL ENCOUNTER (OUTPATIENT)
Facility: HOSPITAL | Age: 28
Discharge: HOME | End: 2018-04-17
Attending: UROLOGY | Admitting: UROLOGY
Payer: COMMERCIAL

## 2018-04-17 ENCOUNTER — ANESTHESIA (OUTPATIENT)
Dept: SURGERY | Facility: HOSPITAL | Age: 28
End: 2018-04-17
Payer: COMMERCIAL

## 2018-04-17 ENCOUNTER — SURGERY (OUTPATIENT)
Age: 28
End: 2018-04-17

## 2018-04-17 DIAGNOSIS — N50.89 MASS OF RIGHT TESTIS: Primary | ICD-10-CM

## 2018-04-17 DIAGNOSIS — N50.89 TESTICULAR MASS: Primary | ICD-10-CM

## 2018-04-17 PROCEDURE — 71000015 HC POSTOP RECOV 1ST HR: Performed by: UROLOGY

## 2018-04-17 PROCEDURE — 25000003 PHARM REV CODE 250

## 2018-04-17 PROCEDURE — 37000009 HC ANESTHESIA EA ADD 15 MINS: Performed by: UROLOGY

## 2018-04-17 PROCEDURE — 88309 TISSUE EXAM BY PATHOLOGIST: CPT | Mod: 26,,, | Performed by: PATHOLOGY

## 2018-04-17 PROCEDURE — 88309 TISSUE EXAM BY PATHOLOGIST: CPT | Performed by: PATHOLOGY

## 2018-04-17 PROCEDURE — 27000221 HC OXYGEN, UP TO 24 HOURS

## 2018-04-17 PROCEDURE — 36000706: Performed by: UROLOGY

## 2018-04-17 PROCEDURE — 36000707: Performed by: UROLOGY

## 2018-04-17 PROCEDURE — 37000008 HC ANESTHESIA 1ST 15 MINUTES: Performed by: UROLOGY

## 2018-04-17 PROCEDURE — 94761 N-INVAS EAR/PLS OXIMETRY MLT: CPT

## 2018-04-17 PROCEDURE — 63600175 PHARM REV CODE 636 W HCPCS: Performed by: STUDENT IN AN ORGANIZED HEALTH CARE EDUCATION/TRAINING PROGRAM

## 2018-04-17 PROCEDURE — 25000003 PHARM REV CODE 250: Performed by: STUDENT IN AN ORGANIZED HEALTH CARE EDUCATION/TRAINING PROGRAM

## 2018-04-17 PROCEDURE — 71000016 HC POSTOP RECOV ADDL HR: Performed by: UROLOGY

## 2018-04-17 PROCEDURE — D9220A PRA ANESTHESIA: Mod: ,,, | Performed by: ANESTHESIOLOGY

## 2018-04-17 PROCEDURE — 25000003 PHARM REV CODE 250: Performed by: UROLOGY

## 2018-04-17 PROCEDURE — 54530 REMOVAL OF TESTIS: CPT | Mod: RT,,, | Performed by: UROLOGY

## 2018-04-17 PROCEDURE — 71000033 HC RECOVERY, INTIAL HOUR: Performed by: UROLOGY

## 2018-04-17 RX ORDER — COLLODION
30 LIQUID (ML) MISCELLANEOUS ONCE
Status: DISCONTINUED | OUTPATIENT
Start: 2018-04-17 | End: 2018-04-17 | Stop reason: HOSPADM

## 2018-04-17 RX ORDER — CEFAZOLIN SODIUM 1 G/3ML
2 INJECTION, POWDER, FOR SOLUTION INTRAMUSCULAR; INTRAVENOUS
Status: DISCONTINUED | OUTPATIENT
Start: 2018-04-17 | End: 2018-04-17 | Stop reason: HOSPADM

## 2018-04-17 RX ORDER — HYDROCODONE BITARTRATE AND ACETAMINOPHEN 5; 325 MG/1; MG/1
1 TABLET ORAL EVERY 4 HOURS PRN
Status: DISCONTINUED | OUTPATIENT
Start: 2018-04-17 | End: 2018-04-17 | Stop reason: HOSPADM

## 2018-04-17 RX ORDER — FENTANYL CITRATE 50 UG/ML
INJECTION, SOLUTION INTRAMUSCULAR; INTRAVENOUS
Status: DISCONTINUED | OUTPATIENT
Start: 2018-04-17 | End: 2018-04-17

## 2018-04-17 RX ORDER — SODIUM CHLORIDE 0.9 % (FLUSH) 0.9 %
3 SYRINGE (ML) INJECTION
Status: DISCONTINUED | OUTPATIENT
Start: 2018-04-17 | End: 2018-04-17 | Stop reason: HOSPADM

## 2018-04-17 RX ORDER — LIDOCAINE HYDROCHLORIDE 10 MG/ML
1 INJECTION, SOLUTION EPIDURAL; INFILTRATION; INTRACAUDAL; PERINEURAL ONCE
Status: COMPLETED | OUTPATIENT
Start: 2018-04-17 | End: 2018-04-17

## 2018-04-17 RX ORDER — PROPOFOL 10 MG/ML
VIAL (ML) INTRAVENOUS
Status: DISCONTINUED | OUTPATIENT
Start: 2018-04-17 | End: 2018-04-17

## 2018-04-17 RX ORDER — HYDROCODONE BITARTRATE AND ACETAMINOPHEN 5; 325 MG/1; MG/1
TABLET ORAL
Status: COMPLETED
Start: 2018-04-17 | End: 2018-04-17

## 2018-04-17 RX ORDER — ONDANSETRON 8 MG/1
8 TABLET, ORALLY DISINTEGRATING ORAL EVERY 8 HOURS PRN
Status: DISCONTINUED | OUTPATIENT
Start: 2018-04-17 | End: 2018-04-17 | Stop reason: HOSPADM

## 2018-04-17 RX ORDER — DEXAMETHASONE SODIUM PHOSPHATE 4 MG/ML
INJECTION, SOLUTION INTRA-ARTICULAR; INTRALESIONAL; INTRAMUSCULAR; INTRAVENOUS; SOFT TISSUE
Status: DISCONTINUED | OUTPATIENT
Start: 2018-04-17 | End: 2018-04-17

## 2018-04-17 RX ORDER — OXYCODONE AND ACETAMINOPHEN 5; 325 MG/1; MG/1
1 TABLET ORAL EVERY 4 HOURS PRN
Qty: 21 TABLET | Refills: 0 | Status: SHIPPED | OUTPATIENT
Start: 2018-04-17 | End: 2018-04-30

## 2018-04-17 RX ORDER — SODIUM CHLORIDE 9 MG/ML
INJECTION, SOLUTION INTRAVENOUS CONTINUOUS
Status: DISCONTINUED | OUTPATIENT
Start: 2018-04-17 | End: 2018-04-17 | Stop reason: HOSPADM

## 2018-04-17 RX ORDER — MIDAZOLAM HYDROCHLORIDE 1 MG/ML
INJECTION, SOLUTION INTRAMUSCULAR; INTRAVENOUS
Status: DISCONTINUED | OUTPATIENT
Start: 2018-04-17 | End: 2018-04-17

## 2018-04-17 RX ORDER — KETAMINE HCL IN 0.9 % NACL 50 MG/5 ML
SYRINGE (ML) INTRAVENOUS
Status: DISCONTINUED | OUTPATIENT
Start: 2018-04-17 | End: 2018-04-17

## 2018-04-17 RX ORDER — FENTANYL CITRATE 50 UG/ML
25 INJECTION, SOLUTION INTRAMUSCULAR; INTRAVENOUS EVERY 5 MIN PRN
Status: COMPLETED | OUTPATIENT
Start: 2018-04-17 | End: 2018-04-17

## 2018-04-17 RX ORDER — LIDOCAINE HCL/PF 100 MG/5ML
SYRINGE (ML) INTRAVENOUS
Status: DISCONTINUED | OUTPATIENT
Start: 2018-04-17 | End: 2018-04-17

## 2018-04-17 RX ADMIN — DEXAMETHASONE SODIUM PHOSPHATE 8 MG: 4 INJECTION, SOLUTION INTRAMUSCULAR; INTRAVENOUS at 01:04

## 2018-04-17 RX ADMIN — FENTANYL CITRATE 25 MCG: 50 INJECTION INTRAMUSCULAR; INTRAVENOUS at 03:04

## 2018-04-17 RX ADMIN — FENTANYL CITRATE 25 MCG: 50 INJECTION, SOLUTION INTRAMUSCULAR; INTRAVENOUS at 02:04

## 2018-04-17 RX ADMIN — Medication 40 MG: at 01:04

## 2018-04-17 RX ADMIN — MIDAZOLAM HYDROCHLORIDE 2 MG: 1 INJECTION, SOLUTION INTRAMUSCULAR; INTRAVENOUS at 01:04

## 2018-04-17 RX ADMIN — HYDROCODONE BITARTRATE AND ACETAMINOPHEN 1 TABLET: 5; 325 TABLET ORAL at 03:04

## 2018-04-17 RX ADMIN — FENTANYL CITRATE 150 MCG: 50 INJECTION, SOLUTION INTRAMUSCULAR; INTRAVENOUS at 01:04

## 2018-04-17 RX ADMIN — LIDOCAINE HYDROCHLORIDE 100 MG: 20 INJECTION, SOLUTION INTRAVENOUS at 01:04

## 2018-04-17 RX ADMIN — PROPOFOL 200 MG: 10 INJECTION, EMULSION INTRAVENOUS at 01:04

## 2018-04-17 RX ADMIN — LIDOCAINE HYDROCHLORIDE 0.2 MG: 10 INJECTION, SOLUTION EPIDURAL; INFILTRATION; INTRACAUDAL; PERINEURAL at 12:04

## 2018-04-17 RX ADMIN — SODIUM CHLORIDE 1000 ML: 0.9 INJECTION, SOLUTION INTRAVENOUS at 12:04

## 2018-04-17 NOTE — OP NOTE
[facility]  Operative Note    Date: 04/17/2018    Pre-Op Diagnosis: Right testicular mass    Post-Op Diagnosis: same    Procedure(s) Performed:   1.  Right orchiectomy    Specimen(s): Right testicle    Staff Surgeon: Murali Currie MD    Assistant Surgeon: Emily Harley MD    Anesthesia: General endotracheal anesthesia    Indications: Bethel Rodrigues is a 27 y.o. male with a right testicular mass.     Findings:   - uncomplicated right inguinal orchiectomy    Estimated Blood Loss: min    Drains: none    Procedure in detail:  After the risks and benefits of the procedure were explained, informed consent was obtained. The patient was taken to the operating room and placed int he supine position.  SCDs were applied and working.  Anesthesia was administered.  The patient was shaved, prepped and draped in the usual sterile fashion. Timeout was performed and preoperative antibiotics were confirmed.      A right 5 cm inguinal incision was made sharply with a 15 blade scapel through the dermis. The underlying subcutaneous tissue was disected with  Bovie electrocautery down to the external oblique fascia. A wheatlander was placed for retraction. Once the external oblique was identified it was bluntly dissected free from the overlying subcutaneous tissue. Metzenbaum scissors were used to dissect the external oblique from the ilioinguinal nerve, paying careful attention not to injure it.  The external oblique was opened.  The ilioinguinal nerve was identified and excluded. The cord was then freed bluntly from its attachments and a penrose drain was wrapped around the cord twice. The testicle was delivered thru the incision and the gubernaculum was identified and carefully  from the testicle with electrocautery. There was no scrotal violation. Once the testicle was free from its gubernacular attachments two blue towels were placed over the surgical field and the spermatic cord was brought  proximally until the internal ring was identified.    Two Estella clamps were placed across the cord. The vas and cord were cut and removed from the table. A 0 vicryl stick tie as well as 2 silk free ties were used to ligate the proximal spermatic cord. One free silk tie was left long. Hemostasis was observed.  The proximal spermatic cord was then delivered  into the peritoneum thru the  internal ring.      The external oblique fascia was closed in a running fashion with 3-0 vicryl. The subcutaneous tissue was closed in 2 layers using a 3-0 vicryl and a 4-0 monocryl.  Dermabond was placed over the skin.     The patient tolerated the procedure well and was transferred to the recovery room in stable condition.      Disposition:  The patient will follow up with Dr. Currie for pathology results.  He was given prescriptions for percocet.      Emily Harley MD

## 2018-04-17 NOTE — TRANSFER OF CARE
"Anesthesia Transfer of Care Note    Patient: Bethel Rodrigues    Procedure(s) Performed: Procedure(s) (LRB):  ORCHIECTOMY radical (Right)    Patient location: PACU    Anesthesia Type: general    Transport from OR: Transported from OR on 6-10 L/min O2 by face mask with adequate spontaneous ventilation    Post pain: adequate analgesia    Post assessment: no apparent anesthetic complications    Post vital signs: stable    Level of consciousness: awake and alert    Nausea/Vomiting: no nausea/vomiting    Complications: none    Transfer of care protocol was followed      Last vitals:   Visit Vitals  BP (!) 147/69   Pulse 81   Temp 36.7 °C (98.1 °F) (Temporal)   Resp 17   Ht 6' 1" (1.854 m)   Wt 103 kg (227 lb)   SpO2 100%   BMI 29.95 kg/m²     "

## 2018-04-17 NOTE — H&P (VIEW-ONLY)
Subjective:       Patient ID: Bethel Rodrigues is a 27 y.o. male.    Chief Complaint:  Testicular Mass      History of Present Illness  HPI  Patient is a 27 y.o. male who is new to our clinic and self-referred for evaluation of a testicular mass.  Noted a right testicular mass while washing last week. No pain.  No skin changes. Slight discomfort in lower abdomen.  No issues urinating.    Works at [employer].  No fh of testicular cancer.       Review of Systems  Review of Systems  All other systems reviewed and negative except pertinent positives noted in HPI.       Objective:     Physical Exam   Nursing note and vitals reviewed.  Constitutional: He is oriented to person, place, and time. Vital signs are normal. He appears well-developed and well-nourished. He is cooperative.   HENT:   Head: Normocephalic.   Neck: No tracheal deviation present.   Cardiovascular: Normal rate and intact distal pulses.    Pulmonary/Chest: Effort normal. No accessory muscle usage. No tachypnea. No respiratory distress.   Abdominal: Soft. Normal appearance. He exhibits no distension, no fluid wave, no ascites and no mass. There is no tenderness. There is no rebound and no CVA tenderness. No hernia. Hernia confirmed negative in the right inguinal area and confirmed negative in the left inguinal area.   Liver/spleen non-palpable.   Genitourinary: Prostate normal and penis normal. Rectal exam shows no external hemorrhoid, no mass, no tenderness and anal tone normal. Prostate is not tender. Right testis shows mass and tenderness (mild). Right testis is descended. Left testis shows no mass and no tenderness. Left testis is descended. Circumcised.   Genitourinary Comments: Scrotum showed no rashes or lesions.  Anus/perineum without lesion.  Epididymis showed no masses or tenderness. No meatal stenosis, meatus in normal location. No penile discharge/plaques.        Musculoskeletal: Normal range of motion.   Lymphadenopathy: No inguinal  adenopathy noted on the right or left side.        Right: No inguinal adenopathy present.        Left: No inguinal adenopathy present.   Neurological: He is alert and oriented to person, place, and time. He has normal strength.   Skin: No bruising and no rash noted.     Psychiatric: He has a normal mood and affect. His speech is normal and behavior is normal. Thought content normal.       Lab Review  No results found for: COLORU, SPECGRAV, PHUR, WBCUR, NITRITE, PROTEINUR, GLUCOSEUR, KETONESU, UROBILINOGEN, BILIRUBINUR, RBCUR      Assessment:        1. Testicular mass           Plan:     Testicular mass      -Ultrasound was independently reviewed today and reveals a right testicular mass 2.1cm.  -I have explained the indication, risks, benefits, and alternatives of the procedure in detail.  The patient voices understanding and all questions have been answered.  The patient agrees to proceed as planned with right radical orchiectomy.  -tumor markers  -cxr  -CT a/p for retroperitoneal node staging.

## 2018-04-17 NOTE — ANESTHESIA PREPROCEDURE EVALUATION
04/17/2018  Bethel Rodrigues is a 27 y.o., male.    Anesthesia Evaluation         Review of Systems      Physical Exam  General:  Well nourished    Airway/Jaw/Neck:  Airway Findings: Mouth Opening: Normal Tongue: Normal  General Airway Assessment: Adult  Mallampati: II  Improves to II with phonation.  TM Distance: Normal, at least 6 cm      Dental:  Dental Findings: In tact   Chest/Lungs:  Chest/Lungs Findings: Clear to auscultation, Normal Respiratory Rate     Heart/Vascular:  Heart Findings: Rate: Normal  Rhythm: Regular Rhythm  Sounds: Normal        Mental Status:  Mental Status Findings:  Cooperative, Alert and Oriented         Anesthesia Plan  Type of Anesthesia, risks & benefits discussed:  Anesthesia Type:  general  Patient's Preference: GEN  Intra-op Monitoring Plan:   Intra-op Monitoring Plan Comments:   Post Op Pain Control Plan: multimodal analgesia  Post Op Pain Control Plan Comments:   Induction:   IV  Beta Blocker:  Patient is not currently on a Beta-Blocker (No further documentation required).       Informed Consent: Patient understands risks and agrees with Anesthesia plan.  Questions answered. Anesthesia consent signed with patient.  ASA Score: 1     Day of Surgery Review of History & Physical:    H&P update referred to the surgeon.         Ready For Surgery From Anesthesia Perspective.

## 2018-04-17 NOTE — INTERVAL H&P NOTE
The patient has been examined and the H&P has been reviewed:    I concur with the findings and no changes have occurred since H&P was written.    Anesthesia/Surgery risks, benefits and alternative options discussed and understood by patient/family.      Active Hospital Problems    Diagnosis  POA    Testicular mass [N50.9]  Yes      Resolved Hospital Problems    Diagnosis Date Resolved POA   No resolved problems to display.

## 2018-04-17 NOTE — DISCHARGE SUMMARY
[facility]  Discharge Note  Short Stay    Admit Date: 4/17/2018    Discharge Date and Time: 04/17/2018    Attending Physician: Murali Currie MD     Discharge Provider: Emily Harley    Diagnoses:  Active Hospital Problems    Diagnosis  POA    *Testicular mass [N50.9]  Yes      Resolved Hospital Problems    Diagnosis Date Resolved POA   No resolved problems to display.       Discharged Condition: good    Hospital Course: Patient was admitted for right radical orchiectomy and tolerated the procedure well with no complications.    Final Diagnoses: Same as principal problem.    Disposition: Home or Self Care    Follow up/Patient Instructions:    Medications:  Reconciled Home Medications:      Medication List      START taking these medications    oxyCODONE-acetaminophen 5-325 mg per tablet  Commonly known as:  PERCOCET  Take 1 tablet by mouth every 4 (four) hours as needed for Pain.            Discharge Procedure Orders  Diet general     Call MD for:  temperature >100.4     Call MD for:  persistent nausea and vomiting     Call MD for:  severe uncontrolled pain     Call MD for:  difficulty breathing, headache or visual disturbances     Call MD for:  redness, tenderness, or signs of infection (pain, swelling, redness, odor or green/yellow discharge around incision site)     Call MD for:  persistent dizziness or light-headedness     No dressing needed       Follow-up Information     Murali Currie MD In 2 weeks.    Specialty:  Urology  Why:  pathology results  Contact information:  [address]  [facility] [address]  [phone]                     Emily Harley MD  Urology, PGY-3  Pager# [phone]

## 2018-04-18 ENCOUNTER — HOSPITAL ENCOUNTER (OUTPATIENT)
Dept: RADIOLOGY | Facility: HOSPITAL | Age: 28
Discharge: HOME | End: 2018-04-18
Attending: UROLOGY
Payer: COMMERCIAL

## 2018-04-18 ENCOUNTER — PATIENT MESSAGE (OUTPATIENT)
Dept: UROLOGY | Facility: CLINIC | Age: 28
End: 2018-04-18

## 2018-04-18 VITALS
TEMPERATURE: 98 F | HEART RATE: 64 BPM | OXYGEN SATURATION: 99 % | WEIGHT: 227 LBS | HEIGHT: 73 IN | SYSTOLIC BLOOD PRESSURE: 140 MMHG | RESPIRATION RATE: 16 BRPM | BODY MASS INDEX: 30.09 KG/M2 | DIASTOLIC BLOOD PRESSURE: 69 MMHG

## 2018-04-18 DIAGNOSIS — N50.89 TESTICULAR MASS: ICD-10-CM

## 2018-04-18 PROCEDURE — 71046 X-RAY EXAM CHEST 2 VIEWS: CPT | Mod: 26,,, | Performed by: RADIOLOGY

## 2018-04-18 PROCEDURE — 74177 CT ABD & PELVIS W/CONTRAST: CPT | Mod: TC

## 2018-04-18 PROCEDURE — 25500020 PHARM REV CODE 255: Performed by: UROLOGY

## 2018-04-18 PROCEDURE — 71046 X-RAY EXAM CHEST 2 VIEWS: CPT | Mod: TC

## 2018-04-18 PROCEDURE — 74177 CT ABD & PELVIS W/CONTRAST: CPT | Mod: 26,,, | Performed by: RADIOLOGY

## 2018-04-18 RX ADMIN — IOHEXOL 100 ML: 350 INJECTION, SOLUTION INTRAVENOUS at 06:04

## 2018-04-18 NOTE — ANESTHESIA POSTPROCEDURE EVALUATION
"Anesthesia Post Evaluation    Patient: Bethel Rodrigues    Procedure(s) Performed: Procedure(s) (LRB):  ORCHIECTOMY radical (Right)    Final Anesthesia Type: general  Patient location during evaluation: PACU  Patient participation: Yes- Able to Participate  Level of consciousness: awake and alert and oriented  Post-procedure vital signs: reviewed and stable  Pain management: adequate  Airway patency: patent  PONV status at discharge: No PONV  Anesthetic complications: no      Cardiovascular status: blood pressure returned to baseline and hemodynamically stable  Respiratory status: unassisted, spontaneous ventilation and room air  Hydration status: euvolemic  Follow-up not needed.        Visit Vitals  BP (!) 140/69 (BP Location: Left arm, Patient Position: Lying)   Pulse 64   Temp 36.7 °C (98.1 °F) (Temporal)   Resp 16   Ht 6' 1" (1.854 m)   Wt 103 kg (227 lb)   SpO2 99%   BMI 29.95 kg/m²       Pain/Heidy Score: Pain Assessment Performed: Yes (4/17/2018  4:46 PM)  Presence of Pain: complains of pain/discomfort (4/17/2018  3:55 PM)  Pain Rating Prior to Med Admin: 2 (4/17/2018  4:46 PM)  Heidy Score: 10 (4/17/2018  3:49 PM)      "

## 2018-04-19 ENCOUNTER — TELEPHONE (OUTPATIENT)
Dept: HEMATOLOGY/ONCOLOGY | Facility: CLINIC | Age: 28
End: 2018-04-19

## 2018-04-19 ENCOUNTER — PATIENT MESSAGE (OUTPATIENT)
Dept: UROLOGY | Facility: CLINIC | Age: 28
End: 2018-04-19

## 2018-04-19 DIAGNOSIS — K76.9 LIVER LESION: Primary | ICD-10-CM

## 2018-04-20 ENCOUNTER — HOSPITAL ENCOUNTER (OUTPATIENT)
Dept: RADIOLOGY | Facility: HOSPITAL | Age: 28
Discharge: HOME | End: 2018-04-20
Attending: UROLOGY
Payer: COMMERCIAL

## 2018-04-20 DIAGNOSIS — K76.9 LIVER LESION: ICD-10-CM

## 2018-04-20 PROCEDURE — 74183 MRI ABD W/O CNTR FLWD CNTR: CPT | Mod: TC

## 2018-04-20 PROCEDURE — 74183 MRI ABD W/O CNTR FLWD CNTR: CPT | Mod: 26,,, | Performed by: RADIOLOGY

## 2018-04-20 PROCEDURE — 25500020 PHARM REV CODE 255: Performed by: UROLOGY

## 2018-04-20 PROCEDURE — A9585 GADOBUTROL INJECTION: HCPCS | Performed by: UROLOGY

## 2018-04-20 RX ORDER — DIAZEPAM 5 MG/1
5 TABLET ORAL ONCE
Qty: 3 TABLET | Refills: 0 | Status: SHIPPED | OUTPATIENT
Start: 2018-04-20 | End: 2018-04-30

## 2018-04-20 RX ORDER — GADOBUTROL 604.72 MG/ML
10 INJECTION INTRAVENOUS
Status: COMPLETED | OUTPATIENT
Start: 2018-04-20 | End: 2018-04-20

## 2018-04-20 RX ADMIN — GADOBUTROL 10 ML: 604.72 INJECTION INTRAVENOUS at 01:04

## 2018-04-23 ENCOUNTER — TELEPHONE (OUTPATIENT)
Dept: HEMATOLOGY/ONCOLOGY | Facility: CLINIC | Age: 28
End: 2018-04-23

## 2018-04-27 NOTE — PROGRESS NOTES
CC:  seminoma    HPI:  Mr. Rodrigues is a 26 yo man who initially presented with a right testicular mass self palpated when taking shower. He was seen by Dr. Currie in the office. Testicular US on 4/16/18 showed bilateral testicular microcalcifications. One 2.1 x 2.1 x 2.1 cm complex solid hypoechoic mass within the right testicle. Tumor markers on 4/16/18 all negative. AFP 2.2, hCG<1.2, .  He underwent R orchiectomy on 4/17/18. Pathology showed a right unifocal seminoma, 2.5 cm, limited to testis, spermatic cord margin negative. 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 Pathologic stage T1a. CT A/P on 4/18/18: 2.1 cm region of abnormal enhancement centered within hepatic VII. Mild hepatosplenomegaly. MRI abdomen on 4/20/18 showed an indeterminate 2.2 cm lesion in the right hepatic lobe. Patient presents today for further management. He currently feels well. Wound has healed up well. Smoked for 7 years, 4-10 cigarettes a day. Quit 3 weeks ago.     PMH:  none    Past Surgical History:   Procedure Laterality Date    HERNIA REPAIR         Social History     Social History    Marital status: Single     Spouse name: N/A    Number of children: N/A    Years of education: N/A     Occupational History    Not on file.     Social History Main Topics    Smoking status: Light Tobacco Smoker    Smokeless tobacco: Never Used    Alcohol use Yes      Comment: occasional    Drug use: No    Sexual activity: Yes     Other Topics Concern    Not on file     Social History Narrative    No narrative on file       Review of Systems   Constitutional: Negative for activity change, appetite change, chills, fatigue, fever and unexpected weight change.   HENT: Negative for mouth sores, nosebleeds, tinnitus, trouble swallowing and voice change.    Eyes: Negative for pain, redness and visual disturbance.   Respiratory: Negative for cough, shortness of breath  and wheezing.    Cardiovascular: Negative for chest pain, palpitations and leg swelling.   Gastrointestinal: Negative for abdominal distention, abdominal pain, blood in stool, constipation, diarrhea, nausea and vomiting.   Endocrine: Negative for polydipsia, polyphagia and polyuria.   Genitourinary: Negative for flank pain, frequency and hematuria.   Musculoskeletal: Negative for arthralgias, back pain and joint swelling.   Skin: Negative for color change, pallor, rash and wound.   Neurological: Negative for dizziness, tremors, seizures, syncope, speech difficulty, weakness, numbness and headaches.   Hematological: Negative for adenopathy. Does not bruise/bleed easily.   Psychiatric/Behavioral: Negative for confusion, dysphoric mood and self-injury. The patient is not nervous/anxious.        Objective:  Physical Exam   Constitutional: He is oriented to person, place, and time. He appears well-developed and well-nourished. No distress.   HENT:   Head: Normocephalic and atraumatic.   Mouth/Throat: Oropharynx is clear and moist. No oropharyngeal exudate.   Eyes: Conjunctivae and EOM are normal. Pupils are equal, round, and reactive to light. No scleral icterus.   Neck: Normal range of motion. Neck supple.   Cardiovascular: Normal rate, regular rhythm and normal heart sounds.  Exam reveals no gallop and no friction rub.    No murmur heard.  Pulmonary/Chest: Effort normal and breath sounds normal. No respiratory distress. He has no wheezes. He has no rales.   Abdominal: Soft. Bowel sounds are normal. He exhibits no distension and no mass. There is no tenderness.   Genitourinary:       Left testis shows no mass, no swelling and no tenderness.   Genitourinary Comments: Wound well healed.    Musculoskeletal: Normal range of motion. He exhibits no edema, tenderness or deformity.   Lymphadenopathy:     He has no cervical adenopathy.   Neurological: He is alert and oriented to person, place, and time. No cranial nerve deficit.  He exhibits normal muscle tone.   Skin: Skin is warm and dry. No rash noted. No erythema. No pallor.   Psychiatric: He has a normal mood and affect. His behavior is normal. Judgment and thought content normal.   Vitals reviewed.      Labs:  Tumor markers on 4/16/18 all negative. AFP 2.2, hCG<1.2, .        Assessment and Plan:  1. Seminoma of testis, stage 1, right    2. Liver mass    3. Tobacco use      1.2  - Mr. 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 CT A/P on 4/18/18: 2.1 cm region of abnormal enhancement centered within hepatic VII. Mild hepatosplenomegaly. MRI abdomen on 4/20/18 showed an indeterminate 2.2 cm lesion in the right hepatic lobe.   - Discussed with Mr. Rodrigues that it would be unusual for him to have liver metastasis when he has a stage I seminoma with no enlarged RP LAD on CT scan.  Will get a PET scan to rule out malignancy  - Discussed with Mr. Rodrigues that in the absence of FDG-avid lesions on the PET scan, for stage I seminoma, adjuvant approach includes observation, 1 cycle of chemotherapy with carboplatin, and radiotherapy. The relapse rates with observation, chemotherapy, and radiotherapy are approximately 15%, 1.6%, and 3.8%, respectively.   - Side effects of chemotherapy (carboplatin) include fatigue, nausea, vomiting, bone marrow suppression, low WBC with increased risk of infection, fever, anemia, need for transfusion, thrombocytopenia, increased risk of bleeding and need for platelet transfusion, diarrhea, constipation. Long term side effects include increased risk of cardiovascular events.   - Side effects of radiotherapy include fatigue, nausea. Long term side effects include increased risk of secondary malignancies including leukemia.   - Regardless of which approach he chooses, cure rate is 99% given that we are still able to do salvage chemotherapy even if it recurs.  - After long discussions, Mr. Rodrigues is favoring  observation. He will go home and think about it and let my office know.  - Discussed surveillance strategy, which include CT A/P, CXR, labs and office visit every 3-4 months for the first 2 years, then every 6 months up to 5 years.   -  I will call him when I have the PET scan results. If he decides to see me back and talk about chemotherapy further, I will see him back at that time. Otherwise, I will see him in 3 months with surveillance imaging studies and labs     3  - encouraged smoking cessation given increased lifetime risk of cardiovascular events if he opts for adjuvant treatment    I spent 60 minutes with the patient with at least 50% of the time on face-to-face counseling.

## 2018-04-30 ENCOUNTER — OFFICE VISIT (OUTPATIENT)
Dept: UROLOGY | Facility: CLINIC | Age: 28
End: 2018-04-30
Payer: COMMERCIAL

## 2018-04-30 ENCOUNTER — LAB VISIT (OUTPATIENT)
Dept: LAB | Facility: OTHER | Age: 28
End: 2018-04-30
Attending: INTERNAL MEDICINE
Payer: COMMERCIAL

## 2018-04-30 ENCOUNTER — INITIAL CONSULT (OUTPATIENT)
Dept: HEMATOLOGY/ONCOLOGY | Facility: CLINIC | Age: 28
End: 2018-04-30
Payer: COMMERCIAL

## 2018-04-30 VITALS
HEART RATE: 52 BPM | DIASTOLIC BLOOD PRESSURE: 58 MMHG | TEMPERATURE: 98 F | BODY MASS INDEX: 30.09 KG/M2 | HEIGHT: 73 IN | WEIGHT: 227 LBS | SYSTOLIC BLOOD PRESSURE: 122 MMHG

## 2018-04-30 VITALS
OXYGEN SATURATION: 96 % | TEMPERATURE: 98 F | DIASTOLIC BLOOD PRESSURE: 58 MMHG | RESPIRATION RATE: 18 BRPM | WEIGHT: 231.69 LBS | SYSTOLIC BLOOD PRESSURE: 113 MMHG | HEIGHT: 73 IN | BODY MASS INDEX: 30.71 KG/M2 | HEART RATE: 77 BPM

## 2018-04-30 DIAGNOSIS — C62.91: ICD-10-CM

## 2018-04-30 DIAGNOSIS — R16.0 LIVER MASS: ICD-10-CM

## 2018-04-30 DIAGNOSIS — C62.90 SEMINOMA: Primary | ICD-10-CM

## 2018-04-30 DIAGNOSIS — Z72.0 TOBACCO USE: ICD-10-CM

## 2018-04-30 DIAGNOSIS — C62.91: Primary | ICD-10-CM

## 2018-04-30 LAB
AFP SERPL-MCNC: 1.9 NG/ML
ALBUMIN SERPL BCP-MCNC: 3.9 G/DL
ALP SERPL-CCNC: 75 U/L
ALT SERPL W/O P-5'-P-CCNC: 25 U/L
ANION GAP SERPL CALC-SCNC: 12 MMOL/L
AST SERPL-CCNC: 16 U/L
BILIRUB SERPL-MCNC: 0.8 MG/DL
BUN SERPL-MCNC: 16 MG/DL
CALCIUM SERPL-MCNC: 9.7 MG/DL
CHLORIDE SERPL-SCNC: 104 MMOL/L
CO2 SERPL-SCNC: 25 MMOL/L
CREAT SERPL-MCNC: 1.2 MG/DL
ERYTHROCYTE [DISTWIDTH] IN BLOOD BY AUTOMATED COUNT: 12.9 %
EST. GFR  (AFRICAN AMERICAN): >60 ML/MIN/1.73 M^2
EST. GFR  (NON AFRICAN AMERICAN): >60 ML/MIN/1.73 M^2
GLUCOSE SERPL-MCNC: 94 MG/DL
HCG INTACT+B SERPL-ACNC: <1.2 MIU/ML
HCT VFR BLD AUTO: 42.1 %
HGB BLD-MCNC: 13.9 G/DL
LDH SERPL L TO P-CCNC: 152 U/L
MCH RBC QN AUTO: 30.3 PG
MCHC RBC AUTO-ENTMCNC: 33 G/DL
MCV RBC AUTO: 92 FL
NEUTROPHILS # BLD AUTO: 7.2 K/UL
PLATELET # BLD AUTO: 282 K/UL
PMV BLD AUTO: 10.6 FL
POTASSIUM SERPL-SCNC: 4.5 MMOL/L
PROT SERPL-MCNC: 7.1 G/DL
RBC # BLD AUTO: 4.59 M/UL
SODIUM SERPL-SCNC: 141 MMOL/L
WBC # BLD AUTO: 10.76 K/UL

## 2018-04-30 PROCEDURE — 99999 PR PBB SHADOW E&M-EST. PATIENT-LVL IV: CPT | Mod: PBBFAC,,, | Performed by: INTERNAL MEDICINE

## 2018-04-30 PROCEDURE — 85027 COMPLETE CBC AUTOMATED: CPT

## 2018-04-30 PROCEDURE — 84702 CHORIONIC GONADOTROPIN TEST: CPT

## 2018-04-30 PROCEDURE — 99205 OFFICE O/P NEW HI 60 MIN: CPT | Mod: S$GLB,,, | Performed by: INTERNAL MEDICINE

## 2018-04-30 PROCEDURE — 82105 ALPHA-FETOPROTEIN SERUM: CPT

## 2018-04-30 PROCEDURE — 99999 PR PBB SHADOW E&M-EST. PATIENT-LVL III: CPT | Mod: PBBFAC,,, | Performed by: UROLOGY

## 2018-04-30 PROCEDURE — 99024 POSTOP FOLLOW-UP VISIT: CPT | Mod: S$GLB,,, | Performed by: UROLOGY

## 2018-04-30 PROCEDURE — 36415 COLL VENOUS BLD VENIPUNCTURE: CPT

## 2018-04-30 PROCEDURE — 83615 LACTATE (LD) (LDH) ENZYME: CPT

## 2018-04-30 PROCEDURE — 80053 COMPREHEN METABOLIC PANEL: CPT

## 2018-04-30 NOTE — PHYSICIAN QUERY
PT Name: Bethel Rodrigues  MR #: [id number]     Physician Query Form - Documentation Clarification      CDS/: Arcelia Cuadra               Contact information:[email or web address]    This form is a permanent document in the medical record.     Query Date: April 29, 2018    By submitting this query, we are merely seeking further clarification of documentation. Please utilize your independent clinical judgment when addressing the question(s) below.    The Medical record reflects the following:    Supporting Clinical Findings Location in Medical Record   SPECIMEN  1) Right testicle and spermatic cord.  FINAL PATHOLOGIC DIAGNOSIS  RIGHT TESTICLE AND SPERMATIC CORD, RADICAL ORCHIECTOMY:  -Seminoma, see synoptic report below.  -Small focus of ectopic adrenal tissue in spermatic cord.  TESTIS CANCER CASE SUMMARY:  Specimen laterality: Right.  Tumor focality: Unifocal.  Tumor size: 2.5 cm in greatest dimension.  Histologic type: Seminoma.  Tumor extension: Tumor limited to testis.  Margins: Spermatic cord margin is negative for malignancy.  Lymphovascular invasion: Not identified.  Lymph nodes: None submitted or found.  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  Staging: pT1a, pNx.  Tumor stage: pT1a: Tumor smaller than 3 cm in size.       Path report                                                                                      Doctor, Please specify the diagnosis or diagnoses associated with the above path report findings.    Provider Use Only    Seminoma  Germ cell neoplasia in situ                                                                                                                           [  ] Clinically undetermined

## 2018-04-30 NOTE — Clinical Note
Labs today: CBC, CMP, AFP, LDH, HCG PET scan next week RTC in 3 months with CT A/P with contrast, CXR, labs (CBC, CMP, AFP, LDH, HCG) prior to return

## 2018-04-30 NOTE — PROGRESS NOTES
Subjective:       Patient ID: Bethel Rodrigues is a 27 y.o. male.    Chief Complaint:  Other (post op)      History of Present Illness  HPI  Patient is a 27 y.o. male who is established to our clinic and was self-referred for evaluation of a testicular mass.  Underwent a right radical orchiectomy.  Some numbness of inner thigh.  Some orthostatic hypotension improving on its own.  No other complaints.       Review of Systems  Review of Systems  All other systems reviewed and negative except pertinent positives noted in HPI.       Objective:     Physical Exam   Constitutional: He is oriented to person, place, and time. He appears well-developed and well-nourished. No distress.   HENT:   Head: Normocephalic and atraumatic.   Eyes: No scleral icterus.   Neck: No tracheal deviation present.   Pulmonary/Chest: Effort normal. No respiratory distress.   Genitourinary:         Genitourinary Comments: No bruising/swelling   Neurological: He is alert and oriented to person, place, and time.   Psychiatric: He has a normal mood and affect. His behavior is normal. Judgment and thought content normal.       Lab Review  No results found for: COLORU, SPECGRAV, PHUR, WBCUR, NITRITE, PROTEINUR, GLUCOSEUR, KETONESU, UROBILINOGEN, BILIRUBINUR, RBCUR      Assessment:        1. Seminoma    2. Liver mass           Plan:     Seminoma    Liver mass      -stage I seminoma ---discussed observation vs. Chemo vs. XRT  -defer to medical oncology.  Patient has appt today.  -discussed liver mass. Unlikely to be related to testis pathology given atypical pattern of spread for pure seminoma.  Discussed observation vs. Biopsy.  Again, will defer to medical oncology  -f/u prn.

## 2018-05-09 ENCOUNTER — TELEPHONE (OUTPATIENT)
Dept: HEMATOLOGY/ONCOLOGY | Facility: CLINIC | Age: 28
End: 2018-05-09

## 2018-05-09 ENCOUNTER — HOSPITAL ENCOUNTER (OUTPATIENT)
Dept: RADIOLOGY | Facility: HOSPITAL | Age: 28
Discharge: HOME | End: 2018-05-09
Attending: INTERNAL MEDICINE
Payer: COMMERCIAL

## 2018-05-09 DIAGNOSIS — C62.91: ICD-10-CM

## 2018-05-09 DIAGNOSIS — C62.90 MALIGNANT NEOPLASM OF TESTIS, UNSPECIFIED LATERALITY, UNSPECIFIED WHETHER DESCENDED OR UNDESCENDED: Primary | ICD-10-CM

## 2018-05-09 DIAGNOSIS — K76.9 LIVER LESION: ICD-10-CM

## 2018-05-09 LAB — POCT GLUCOSE: 79 MG/DL (ref 70–110)

## 2018-05-09 PROCEDURE — 78815 PET IMAGE W/CT SKULL-THIGH: CPT | Mod: TC

## 2018-05-09 PROCEDURE — A9552 F18 FDG: HCPCS

## 2018-05-09 PROCEDURE — 78815 PET IMAGE W/CT SKULL-THIGH: CPT | Mod: 26,PI,, | Performed by: RADIOLOGY

## 2018-07-23 ENCOUNTER — HOSPITAL ENCOUNTER (OUTPATIENT)
Dept: RADIOLOGY | Facility: HOSPITAL | Age: 28
Discharge: HOME | End: 2018-07-23
Attending: INTERNAL MEDICINE

## 2018-07-23 DIAGNOSIS — C62.90 MALIGNANT NEOPLASM OF TESTIS, UNSPECIFIED LATERALITY, UNSPECIFIED WHETHER DESCENDED OR UNDESCENDED: ICD-10-CM

## 2018-07-23 DIAGNOSIS — K76.9 LIVER LESION: ICD-10-CM

## 2018-07-23 DIAGNOSIS — C62.91: ICD-10-CM

## 2018-07-23 PROCEDURE — 74177 CT ABD & PELVIS W/CONTRAST: CPT | Mod: TC

## 2018-07-23 PROCEDURE — 76705 ECHO EXAM OF ABDOMEN: CPT | Mod: TC

## 2018-07-23 PROCEDURE — 71046 X-RAY EXAM CHEST 2 VIEWS: CPT | Mod: 26,,, | Performed by: RADIOLOGY

## 2018-07-23 PROCEDURE — 71046 X-RAY EXAM CHEST 2 VIEWS: CPT | Mod: TC,FY

## 2018-07-23 PROCEDURE — 74177 CT ABD & PELVIS W/CONTRAST: CPT | Mod: 26,,, | Performed by: RADIOLOGY

## 2018-07-23 PROCEDURE — 76705 ECHO EXAM OF ABDOMEN: CPT | Mod: 26,,, | Performed by: RADIOLOGY

## 2018-07-23 PROCEDURE — 25500020 PHARM REV CODE 255: Performed by: INTERNAL MEDICINE

## 2018-07-23 RX ADMIN — IOHEXOL 100 ML: 350 INJECTION, SOLUTION INTRAVENOUS at 01:07

## 2018-07-23 RX ADMIN — IOHEXOL 15 ML: 350 INJECTION, SOLUTION INTRAVENOUS at 12:07

## 2018-07-23 RX ADMIN — IOHEXOL 15 ML: 350 INJECTION, SOLUTION INTRAVENOUS at 11:07

## 2018-07-25 ENCOUNTER — OFFICE VISIT (OUTPATIENT)
Dept: HEMATOLOGY/ONCOLOGY | Facility: CLINIC | Age: 28
End: 2018-07-25

## 2018-07-25 VITALS
BODY MASS INDEX: 29.86 KG/M2 | TEMPERATURE: 98 F | DIASTOLIC BLOOD PRESSURE: 67 MMHG | SYSTOLIC BLOOD PRESSURE: 124 MMHG | HEART RATE: 50 BPM | HEIGHT: 73 IN | RESPIRATION RATE: 19 BRPM | WEIGHT: 225.31 LBS | OXYGEN SATURATION: 99 %

## 2018-07-25 DIAGNOSIS — K76.9 LIVER LESION: ICD-10-CM

## 2018-07-25 DIAGNOSIS — C62.11 MALIGNANT NEOPLASM OF DESCENDED RIGHT TESTIS: Primary | ICD-10-CM

## 2018-07-25 PROCEDURE — 99214 OFFICE O/P EST MOD 30 MIN: CPT | Mod: S$PBB,,, | Performed by: INTERNAL MEDICINE

## 2018-07-25 PROCEDURE — 99999 PR PBB SHADOW E&M-EST. PATIENT-LVL III: CPT | Mod: PBBFAC,,, | Performed by: INTERNAL MEDICINE

## 2018-07-25 PROCEDURE — 99213 OFFICE O/P EST LOW 20 MIN: CPT | Mod: PBBFAC | Performed by: INTERNAL MEDICINE

## 2018-07-25 RX ORDER — CYANOCOBALAMIN (VITAMIN B-12) 500 MCG
0.5 TABLET ORAL
COMMUNITY
End: 2019-07-26

## 2018-07-25 NOTE — Clinical Note
RTC in 3 months with CBC, CMP, LDH, AFP, HCG, CT abdomen/pelvis, Chest xray 2 days prior to return visit

## 2018-07-25 NOTE — PROGRESS NOTES
PROGRESS NOTE    Subjective:       Patient ID: Bethel Rodrigues is a 28 y.o. male.    Chief Complaint:  Follow up for testicular cancer    Oncologic History:  1. Mr. Rodrigues is a 26 yo man who initially presented with a right testicular mass self palpated when taking shower. He was seen by Dr. Curire in the office. Testicular US on 18 showed bilateral testicular microcalcifications. One 2.1 x 2.1 x 2.1 cm complex solid hypoechoic mass within the right testicle. Tumor markers on 18 all negative. AFP 2.2, hCG<1.2, .  He underwent R orchiectomy on 18. Pathology showed a right unifocal seminoma, 2.5 cm, limited to testis, spermatic cord margin negative. 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 Pathologic stage T1a. CT A/P on 18: 2.1 cm region of abnormal enhancement centered within hepatic VII. Mild hepatosplenomegaly. MRI abdomen on 18 showed an indeterminate 2.2 cm lesion in the right hepatic lobe.   2. Saw me on 18. PET scan 18 was negative. After discussions re multiple options, he opted active surveillance.   3. CT A/P on 18 showed unchanged 2.2 cm liver lesion. CXR negative. Tumor markers negative.     History of Present Illness:   Mr. Rodrigues returns today for follow up. Has been feeling well. No complaints. Trying to quit smoking.     ECO    ROS:   A 10-point system review is obtained and negative.     Physical Examination:   Vital signs reviewed.   Gen: well hydrated, well developed, in no acute distress.  HEENT: normocephalic, anicteric, PERRLA, EOMI, oropharynx clear  Neck: supple, no JVD, thyromegaly, cervical or supraclavicular LAD  Lungs: CTAB, no wheezes or rales  Heart: RRR, no M/R/G  Abdomen: soft, no tenderness, non-distended, no hepatosplenomegaly, mass, or hernia.   : no rash or testicular masses  Ext: no cyanosis, edema,  deformity  Neuro: alert and oriented x 4, no focal neuro deficit  Skin: no rash, open wounds or ulcers  Psych: pleasant and appropriate mood and affect    Objective:     Diagnostic Tests:  I reviewed his CXR, CT A/P, and US abdomen. 2.2 cm liver lesion unchanged compared to prior    Laboratory Data:      Assessment/Plan:     1. Malignant neoplasm of descended right testis    2. Liver lesion      - Mr. 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 His 2.2 cm liver lesion is unchanged compared to CT/MRI 3 months ago. PET scan in May did not show any hypermetabolic lesions.   - doing well.   - return in 3 months with labs, CXR, CT A/P  - Knows to call should symptoms develop in the interim.     Electronically signed by Mohit Woodson        Distress Screening Results: Psychosocial Distress screening score of Distress Score: 0 noted and reviewed. No intervention indicated.  Answers for HPI/ROS submitted by the patient on 7/24/2018   appetite change : No  unexpected weight change: No  visual disturbance: No  cough: No  shortness of breath: No  chest pain: No  abdominal pain: No  diarrhea: No  frequency: No  back pain: No  rash: No  headaches: No  adenopathy: No  nervous/ anxious: No

## 2019-07-26 ENCOUNTER — HOSPITAL ENCOUNTER (OUTPATIENT)
Dept: RADIOLOGY | Facility: HOSPITAL | Age: 29
Discharge: HOME | End: 2019-07-26
Attending: PHYSICIAN ASSISTANT
Payer: COMMERCIAL

## 2019-07-26 DIAGNOSIS — R52 PAIN: ICD-10-CM

## 2019-07-26 DIAGNOSIS — R60.9 SWELLING: ICD-10-CM

## 2019-07-30 DIAGNOSIS — M25.571 RIGHT ANKLE PAIN, UNSPECIFIED CHRONICITY: Primary | ICD-10-CM

## 2019-08-01 ENCOUNTER — OFFICE VISIT (OUTPATIENT)
Dept: ORTHOPEDICS | Facility: CLINIC | Age: 29
End: 2019-08-01
Payer: COMMERCIAL

## 2019-08-01 ENCOUNTER — HOSPITAL ENCOUNTER (OUTPATIENT)
Dept: RADIOLOGY | Facility: HOSPITAL | Age: 29
Discharge: HOME | End: 2019-08-01
Attending: NEUROMUSCULOSKELETAL MEDICINE & OMM
Payer: COMMERCIAL

## 2019-08-01 VITALS — DIASTOLIC BLOOD PRESSURE: 68 MMHG | SYSTOLIC BLOOD PRESSURE: 124 MMHG

## 2019-08-01 DIAGNOSIS — M99.05 SOMATIC DYSFUNCTION OF PELVIC REGION: ICD-10-CM

## 2019-08-01 DIAGNOSIS — M99.06 SOMATIC DYSFUNCTION OF LOWER EXTREMITY: ICD-10-CM

## 2019-08-01 DIAGNOSIS — M79.10 MYALGIA: ICD-10-CM

## 2019-08-01 DIAGNOSIS — M25.571 RIGHT ANKLE PAIN, UNSPECIFIED CHRONICITY: ICD-10-CM

## 2019-08-01 PROCEDURE — 73610 X-RAY EXAM OF ANKLE: CPT | Mod: TC,PO,RT

## 2019-08-01 PROCEDURE — 99204 OFFICE O/P NEW MOD 45 MIN: CPT | Mod: 25,S$GLB,, | Performed by: NEUROMUSCULOSKELETAL MEDICINE & OMM

## 2019-08-01 PROCEDURE — 99204 PR OFFICE/OUTPT VISIT, NEW, LEVL IV, 45-59 MIN: ICD-10-PCS | Mod: 25,S$GLB,, | Performed by: NEUROMUSCULOSKELETAL MEDICINE & OMM

## 2019-08-01 PROCEDURE — 98925 OSTEOPATH MANJ 1-2 REGIONS: CPT | Mod: S$GLB,,, | Performed by: NEUROMUSCULOSKELETAL MEDICINE & OMM

## 2019-08-01 PROCEDURE — 99999 PR PBB SHADOW E&M-EST. PATIENT-LVL II: CPT | Mod: PBBFAC,,, | Performed by: NEUROMUSCULOSKELETAL MEDICINE & OMM

## 2019-08-01 PROCEDURE — 73610 X-RAY EXAM OF ANKLE: CPT | Mod: 26,RT,, | Performed by: RADIOLOGY

## 2019-08-01 PROCEDURE — 99999 PR PBB SHADOW E&M-EST. PATIENT-LVL II: ICD-10-PCS | Mod: PBBFAC,,, | Performed by: NEUROMUSCULOSKELETAL MEDICINE & OMM

## 2019-08-01 PROCEDURE — 98925 PR OSTEOPATHIC MANIP,1-2 BODY REGN: ICD-10-PCS | Mod: S$GLB,,, | Performed by: NEUROMUSCULOSKELETAL MEDICINE & OMM

## 2019-08-01 PROCEDURE — 73610 XR ANKLE COMPLETE 3 VIEW RIGHT: ICD-10-PCS | Mod: 26,RT,, | Performed by: RADIOLOGY

## 2019-08-01 NOTE — PROGRESS NOTES
Subjective:     Bethel Rodrigues     Chief Complaint   Patient presents with    Right Ankle - Pain       HPI    Bethel is a 29 y.o. male coming in today for right ankle pain that began 1 month(s) ago, referred by self. Pt. describes the pain as a 1/10 achy pain that does not radiate. There was a fall/injury/ or trauma associated with the onset of symptoms. His friend jumped up to touch a sign and came down on top of pt's foot. He was able to bear weight at the time of the injury. He had some significant pain right after, which has improved, but he notes continued pain and pressure in the lateral ankle with walking more than 1/4 mile.The pain is better with rest and worse with walking more than 1/4 mile, walking quickly. Pt. Denies any other musculoskeletal complaints at this time.     Joint instability? yes  Mechanical locking/clicking? no  Affecting ADL's? yes  Affecting sleep? yes    Occupation: , on his feet and walks a lot at work    Review of Systems   Constitutional: Negative for chills and fever.   HENT: Negative for hearing loss and tinnitus.    Eyes: Negative for blurred vision and photophobia.   Respiratory: Negative for cough and shortness of breath.    Cardiovascular: Negative for chest pain and leg swelling.   Gastrointestinal: Negative for abdominal pain, heartburn, nausea and vomiting.   Genitourinary: Negative for dysuria and hematuria.   Musculoskeletal: Positive for joint pain. Negative for back pain, falls, myalgias and neck pain.   Skin: Negative for rash.   Neurological: Negative for dizziness, tingling, focal weakness, weakness and headaches.   Endo/Heme/Allergies: Negative for environmental allergies. Does not bruise/bleed easily.   Psychiatric/Behavioral: Negative for depression. The patient is not nervous/anxious.        PAST MEDICAL HISTORY:   Past Medical History:   Diagnosis Date    Hernia of abdominal wall     2 year of age    Testicular cancer     Dx April 2018     PAST  SURGICAL HISTORY:   Past Surgical History:   Procedure Laterality Date    HERNIA REPAIR      ORCHIECTOMY radical Right 4/17/2018    Performed by Murali Currie MD at [facility] OR [address] [place] [address]     FAMILY HISTORY:   Family History   Problem Relation Age of Onset    Hepatitis Father         hep c    Cholelithiasis Sister      SOCIAL HISTORY:   Social History     Socioeconomic History    Marital status: Single     Spouse name: Not on file    Number of children: Not on file    Years of education: Not on file    Highest education level: Not on file   Occupational History    Not on file   Social Needs    Financial resource strain: Not on file    Food insecurity:     Worry: Not on file     Inability: Not on file    Transportation needs:     Medical: Not on file     Non-medical: Not on file   Tobacco Use    Smoking status: Light Tobacco Smoker    Smokeless tobacco: Never Used   Substance and Sexual Activity    Alcohol use: Yes     Comment: occasional    Drug use: No    Sexual activity: Yes   Lifestyle    Physical activity:     Days per week: Not on file     Minutes per session: Not on file    Stress: Not on file   Relationships    Social connections:     Talks on phone: Not on file     Gets together: Not on file     Attends [employer] service: Not on file     Active member of club or organization: Not on file     Attends meetings of clubs or organizations: Not on file     Relationship status: Not on file   Other Topics Concern    Not on file   Social History Narrative    Not on file       MEDICATIONS: No current outpatient medications on file.  ALLERGIES:   Review of patient's allergies indicates:   Allergen Reactions    No known drug allergies        Objective:     VITAL SIGNS: /68 (BP Location: Left arm, Patient Position: Sitting, BP Method: Medium (Manual))    General    Nursing note and vitals reviewed.  Constitutional: He is oriented to person, place, and time. He appears well-developed and  well-nourished.   HENT:   Head: Normocephalic and atraumatic.   no nasal discharge, no external ear redness or discharge   Eyes:   EOM is full and smooth  No eye redness or discharge   Neck: Neck supple. No tracheal deviation present.   Cardiovascular: Normal rate.    2+ Radial pulse bilaterally  2+ Dorsalis Pedis pulse bilaterally  No LE edema appreciated   Pulmonary/Chest: Effort normal. No respiratory distress.   Abdominal: He exhibits no distension.   No rigidity   Neurological: He is alert and oriented to person, place, and time. He exhibits normal muscle tone. Coordination normal.   See details below   Psychiatric: He has a normal mood and affect. His behavior is normal.               MUSCULOSKELETAL EXAM  ANKLE: right ANKLE  The affected ankle is compared to the contralateral ankle.    Observation:    There is no erythema, or ecchymosis. Mild lateal ankle edema.  Shoes reveal a normal wear pattern.  No pes planus/cavus or hallux valgus..  Negative too-many toes sign.    Normal callus pattern on the feet bilaterally.    Achilles tendon and calcaneal insertion reveals no deformities  No leg or intrinsic foot muscle atrophy.  Squatting reveals symmetric pronation of the bilateral feet.   Able to rise on toes with symmetric supination - + lateral ankle pain (improved following OMT)  Normal gait without evidence of antalgia.    ROM (* = with pain):  Active dorsiflexion to 20° on left and 20° on right  Active plantarflexion to 50° on left and 50° on right    Active ankle inversion to 35° on left and 35° on right*  Active ankle eversion to 15° on left and 15° on right  Full active flexion/extension of the toes bilaterally.   Heel cords without tightness bilaterally.    Tenderness To Palpation:  + tenderness at the ATFL at the talus insertion.  No tenderness at the CFL, PTFL, or deltoid ligaments  No tenderness over the distal anterior syndesmosis, distal tibia/fibula, fibular head/shaft  No tenderness at medial or  lateral malleoli   No tenderness at navicular, cuboid, cuneiforms, talus, or calcaneous  No tenderness along the metatarsals or phalanges  No tenderness at the Achilles tendon calcaneal insertion  No tenderness at the posterior tibial or peroneal tendons    Strength Testing (* = with pain):  Dorsiflexion - 5/5 on left and 5/5 on right  Platarflexion - 5/5 on left and 5/5 on right  Resisted Inversion - 5/5 on left and 5/5 on right* (improved following OMT)  Resisted Eversion - 5/5 on left and 5/5 on right  Great Toe Extension - 5/5 on left and 5/5 on right  Great Toe Flexion - 5/5 on left and 5/5 on right    Special Tests:  Anterior talar drawer - negative and without dimpling  Talar tilt - negative  Reverse Talar tilt - negative    Heel tap test - negative  Distal tib/fib squeeze test - negative  External rotation stress (Kleiger) test - negative  Palma squeeze test - negative    Metatarsal squeeze test - negative  Midfoot stress test - negative  Calcaneal squeeze test - negative    No subluxation of the peroneal tendons with resisted eversion.    Vascular/Sensory Exam:  DP and PT pulses intact bilaterally  No skin changes, no abnormal hair distribution  Sensation intact to light touch throughout the saphenous, sural, superficial peroneal, deep peroneal, and tibial nerve distributions  Negative Tinel's test over tarsal tunnel  2+/4 reflexes at L4 and S1 dermatomes  Capillary refill intact <2 seconds in all toes bilaterally.    TART (Tissue texture abnormality, Asymmetry,  Restriction of motion and/or Tenderness) changes:       Lumbar Spine   L1 Neutral   L2 Neutral   L3 Neutral   L4 Neutral   L5 Neutral       Pelvis:  · Innominate:Right anterior rotation  · Pubic bone:Right inferior pubic shear    Sacrum:Neutral    Lower Extremity:  · Leg lengths symmetric    Location/joint Finding/restriction   Fibular head Posterior on right   Tibia Neutral   Talocrural joint Bilateral, right continual distortion (CD) fascial  distortion    Subtalar Joint Right   Cuboid Neutral   Talo-navicular joint Right   Navicular-cuneiform joint Right   1st, 2nd, 3rd, 4th, 5th Cuneiform-metatarsal joint Neutral   1st, 2nd, 3rd, 4th, 5th metatarsal Neutral   1st, 2nd, 3rd, 4th, 5th phalange Neutral   Lateral ankle Trigger band (TB) fascial distortion on right   Lateral maleoulus continual distortion (CD) fascial distortion on right   Sinus tarsi Herniated trigger point (HTP) fascial distortion on right       Key   F= Flexed   E = Extended   R = Rotated   S = Sidebent   TTA = tissue texture abnormality     IMAGIN. X-ray ordered due to right ankle. (AP, lateral, and oblique views) taken today.   2. X-ray images were reviewed personally by me and then directly with patient.  3. FINDINGS: X-ray images obtained demonstrate no cortical irregularities, sclerosis, osteophyte formation, or subchonral cysts. There is no joint space narrowing. No fracture or dislocation.  No bone destruction identified  4. IMPRESSION: No pathology or irregularities appreciated.       Assessment:      Encounter Diagnoses   Name Primary?    First degree ankle sprain, right, initial encounter Yes    Myalgia     Somatic dysfunction of lower extremity     Somatic dysfunction of pelvic region           Plan:     1.  Right ankle pain secondary to grade1 lateral ankle sprain 1 month ago.  Today's x-rays were normal.  - OMT performed today to address associated biomechanical and fascial restrictions  and HEP started.   - Recommend Ice up to 20 minutes at a time and OTC NSAIDS prn for pain control  - X-ray images of right ankle taken today (AP, lateral, and oblique views) showed no abnormalities. Images were personally reviewed with patient.    2. OMT 1-2 regions. Oral consent obtained.  Reviewed benefits and potential side effects, , including bruising at site of treatment and increased soreness for the next 24-48 hours. Pt. Instructed to increased water intake by 1 L today  and tomorrow to help with any residual soreness.   - OMT indicated today due to signs and symptoms as well as local and remote somatic dysfunction findings and their related neurokinetic, lymphatic, fascial and/or arteriovenous body connections.   - OMT techniques used: Muscle Energy, Fascial Distortion Model and Articulatory   - Treatment was tolerated well. Improvement noted in segmental mobility post-treatment in dysfunctional regions. There were no adverse events and no complications immediately following treatment.     3. Pt. Given the following HEP:  A) Ankle proprioception exercises: balance on  ankle on single leg with eyes open, then progressing to balancing while eyes closed, and then on uneven surface (throw pillow or balance ball)  - hold each position for 30 seconds-1 mintue. Repeat 2-3 times a day for each ankle.      The patient was taught a homegoing physical therapy regimen as described above. The patient demonstrated understanding of the exercises and proper technique of their execution.      4. Follow-up as needed if pain persists or deteriorates    5. Patient agreeable to today's plan and all questions were answered    This note is dictated using the M*Modal Fluency Direct word recognition program. There are word recognition mistakes that are occasionally missed on review.

## 2021-01-07 NOTE — PROGRESS NOTES
Name: Bethel Rodrigues  Clinic Number: [id number]  Date of Treatment: 10/09/2017   Diagnosis:   Encounter Diagnoses   Name Primary?    Weakness of right upper extremity     Acute pain of right shoulder     Instability of right shoulder joint        Physician: Sky Huffman PA*    Time in: 2:06 PM  Time Out: 2:46 PM  Total Treatment Time: 40  Last PN: NA  Date of eval: 09/25/2017  Visit #: 2/20  Auth expiration: 12/31/2017  POC expiration: 12/26/2017    Precautions: Standard     Subjective     Bethel reports his shoulder is feeling good today. Pt states he hasn't has any pain in the last 3-4 days. Pt reports compliance with HEP 2-3 times per week. Pt reports their pain to be 0/10 on a 0-10 scale with 0 being no pain and 10 being the worst pain imaginable.    Objective     Bethel received therapeutic exercises to develop strength and endurance for 40 minutes including:     UBE x 6 minutes, switch direction every 2 minutes     Sidelying ER  2#  3x10  Sidelying Abd  2# 3 x10     Prone scap retraction 2 x 10, 5 second hold    Prone row 3 x 10   Prone I Y T  2 x 15 each     Wall walks YTB 10' x 2 laps   Wall slides with YTB 2 x 15    Ball on wall stabilization - CW/CCW, Up/down, M/L x 30 seconds each     (next visit: wall climbs, D2 pattern with pulley, standing scap retraction)     Written Home Exercises Provided: No, pt instructed to continue with current HEP    Pt educated on new therapeutic exercies. Pt demo good understanding of the education provided. Bethel demonstrated good return demonstration of activities.     Assessment   Pt frequently required verbal cueing to decrease speed of exercises performed to increase muscle activation. Pt demonstrated good activation of middle and lower trapezius during prone lying exercises, occasionally requiring tactile cueing once fatigued. Pt experienced muscle fatigue during ball on wall stabilization exercises and wall slides. Bethel participated in today's treatment  session without symptom provocation or adverse effects. Pt will continue to benefit from skilled PT intervention. Medical Necessity is demonstrated by:  Pain limits function of effected part for some activities and Weakness.    Patient is making good progress towards established goals.    New/Revised goals: none at this time      Plan   Continue with established Plan of Care towards PT goals.     Skylar Barbosa, PT   shortness of breath

## 2021-04-29 NOTE — PROGRESS NOTES
Patient did not show for appointment scheduled at 2:00 pm on Wednesday, October 4th. This is the patient's second no show appointment. PT called patient to ask if he wanted to continue therapy and remind him of future appointments scheduled.    Chest pain

## 2021-11-11 NOTE — LETTER
April 30, 2018      Murali Currie MD  [address]  [facility] [address]           [facility]  [address], [address]  [facility] [address]  Phone: [phone]          Patient: Bethel Rodrigues   MR Number: [id number]   YOB: 1990   Date of Visit: 4/30/2018       Dear Dr. Murali Currie:    Thank you for referring Bethel Rodrigues to me for evaluation. Attached you will find relevant portions of my assessment and plan of care.    If you have questions, please do not hesitate to call me. I look forward to following Bethel Rodrigues along with you.    Sincerely,    Mohit Woodson MD    Enclosure  CC:  No Recipients    If you would like to receive this communication electronically, please contact [email or web address] or [phone] to request more information on [facility] access.    For providers and/or their staff who would like to refer a patient to [facility], please contact us through our one-stop-shop provider referral line, [facility], at [phone].    If you feel you have received this communication in error or would no longer like to receive these types of communications, please e-mail [email or web address]         
normal S1, S2 heard

## 2021-12-26 ENCOUNTER — OFFICE VISIT (OUTPATIENT)
Dept: URGENT CARE | Facility: CLINIC | Age: 31
End: 2021-12-26
Payer: COMMERCIAL

## 2021-12-26 VITALS
OXYGEN SATURATION: 96 % | HEART RATE: 73 BPM | SYSTOLIC BLOOD PRESSURE: 123 MMHG | TEMPERATURE: 98 F | HEIGHT: 73 IN | RESPIRATION RATE: 18 BRPM | BODY MASS INDEX: 33.13 KG/M2 | DIASTOLIC BLOOD PRESSURE: 83 MMHG | WEIGHT: 250 LBS

## 2021-12-26 DIAGNOSIS — J06.9 UPPER RESPIRATORY TRACT INFECTION, UNSPECIFIED TYPE: ICD-10-CM

## 2021-12-26 DIAGNOSIS — R05.9 COUGH: Primary | ICD-10-CM

## 2021-12-26 LAB
CTP QC/QA: YES
SARS-COV-2 RDRP RESP QL NAA+PROBE: NEGATIVE

## 2021-12-26 PROCEDURE — 3079F PR MOST RECENT DIASTOLIC BLOOD PRESSURE 80-89 MM HG: ICD-10-PCS | Mod: CPTII,S$GLB,, | Performed by: INTERNAL MEDICINE

## 2021-12-26 PROCEDURE — 3008F PR BODY MASS INDEX (BMI) DOCUMENTED: ICD-10-PCS | Mod: CPTII,S$GLB,, | Performed by: INTERNAL MEDICINE

## 2021-12-26 PROCEDURE — 3074F SYST BP LT 130 MM HG: CPT | Mod: CPTII,S$GLB,, | Performed by: INTERNAL MEDICINE

## 2021-12-26 PROCEDURE — 99214 OFFICE O/P EST MOD 30 MIN: CPT | Mod: S$GLB,,, | Performed by: INTERNAL MEDICINE

## 2021-12-26 PROCEDURE — U0002: ICD-10-PCS | Mod: QW,S$GLB,, | Performed by: INTERNAL MEDICINE

## 2021-12-26 PROCEDURE — 1159F MED LIST DOCD IN RCRD: CPT | Mod: CPTII,S$GLB,, | Performed by: INTERNAL MEDICINE

## 2021-12-26 PROCEDURE — 3079F DIAST BP 80-89 MM HG: CPT | Mod: CPTII,S$GLB,, | Performed by: INTERNAL MEDICINE

## 2021-12-26 PROCEDURE — 3008F BODY MASS INDEX DOCD: CPT | Mod: CPTII,S$GLB,, | Performed by: INTERNAL MEDICINE

## 2021-12-26 PROCEDURE — 1160F PR REVIEW ALL MEDS BY PRESCRIBER/CLIN PHARMACIST DOCUMENTED: ICD-10-PCS | Mod: CPTII,S$GLB,, | Performed by: INTERNAL MEDICINE

## 2021-12-26 PROCEDURE — 1159F PR MEDICATION LIST DOCUMENTED IN MEDICAL RECORD: ICD-10-PCS | Mod: CPTII,S$GLB,, | Performed by: INTERNAL MEDICINE

## 2021-12-26 PROCEDURE — 1160F RVW MEDS BY RX/DR IN RCRD: CPT | Mod: CPTII,S$GLB,, | Performed by: INTERNAL MEDICINE

## 2021-12-26 PROCEDURE — U0002 COVID-19 LAB TEST NON-CDC: HCPCS | Mod: QW,S$GLB,, | Performed by: INTERNAL MEDICINE

## 2021-12-26 PROCEDURE — 99214 PR OFFICE/OUTPT VISIT, EST, LEVL IV, 30-39 MIN: ICD-10-PCS | Mod: S$GLB,,, | Performed by: INTERNAL MEDICINE

## 2021-12-26 PROCEDURE — 3074F PR MOST RECENT SYSTOLIC BLOOD PRESSURE < 130 MM HG: ICD-10-PCS | Mod: CPTII,S$GLB,, | Performed by: INTERNAL MEDICINE

## 2023-01-10 NOTE — DISCHARGE INSTRUCTIONS
Post Scrotal Surgery Instructions  Do not strain to have a bowel movement  No strenuous exercise x 7 days  No driving while you are on narcotic pain medications    You can expect:  Some swelling in your scrotum but significant swelling or pain should be evaluated by an MD or ER  Swelling should resolve in the next few months    You can shower in 2 days    You can place ice on your scrotum for 30 min to 1 hour at a time for swelling  You can also elevate your scrotum under towels to help with swelling when you are sitting    You can wear a jock strap or tight white underwear to help with swelling     Call the doctor if:   Temperature is greater than 101F   Persistent vomiting and inability to keep food down     Spironolactone Pregnancy And Lactation Text: This medication can cause feminization of the male fetus and should be avoided during pregnancy. The active metabolite is also found in breast milk.

## 2023-01-26 ENCOUNTER — OFFICE VISIT (OUTPATIENT)
Dept: PODIATRY | Facility: CLINIC | Age: 33
End: 2023-01-26
Payer: COMMERCIAL

## 2023-01-26 VITALS
HEIGHT: 73 IN | BODY MASS INDEX: 29.82 KG/M2 | DIASTOLIC BLOOD PRESSURE: 70 MMHG | WEIGHT: 225 LBS | SYSTOLIC BLOOD PRESSURE: 118 MMHG

## 2023-01-26 DIAGNOSIS — M77.52 TENDINITIS OF LEFT FOOT: ICD-10-CM

## 2023-01-26 DIAGNOSIS — M79.672 ARCH PAIN, LEFT: Primary | ICD-10-CM

## 2023-01-26 PROCEDURE — 99203 OFFICE O/P NEW LOW 30 MIN: CPT | Performed by: PODIATRIST

## 2023-01-26 RX ORDER — LISDEXAMFETAMINE DIMESYLATE 30 MG/1
CAPSULE ORAL
COMMUNITY
Start: 2022-12-30

## 2023-01-26 NOTE — LETTER
"    1/26/2023         RE: Antonio Mortensen  [address] [facility] [id number]        Dear Colleague,    Thank you for referring your patient, Antonio Mortensen, to the [facility]. Please see a copy of my visit note below.    Foot & Ankle Surgery  January 26, 2023    CC: \"mid-foot pain\"    I was asked to see Antonio Mortensen regarding the chief complaint by:  self    HPI:  Pt is a 32 year old male who presents with above complaint.  2-day history of left foot pain.  He describes burning and shooting pain, 7 out of 10 with \"any pressure on foot\".  This is worse with \"standing and walking\".  \"Nothing\" for treatment.  The patient went cross-country skiing on Sunday.  He does not recall any specific injury and did not have any \"immediate pain\".  He states it started feeling \"a little weird\", especially with going up and down stairs and is now having sharp pain in the foot.  He points to the plantar lateral left midfoot and forefoot and states it \"wraps around\" the side of the foot.    ROS:   Pos for CC.  The patient denies current nausea, vomiting, chills, fevers, belly pain, calf pain, chest pain or SOB.  Complete remainder of ROS is otherwise neg.    VITALS:    Vitals:    01/26/23 1520   BP: 118/70   Weight: 102.1 kg (225 lb)   Height: 1.854 m (6' 1\")       PMH:  No past medical history on file.    SXHX:  No past surgical history on file.     MEDS:    Current Outpatient Medications   Medication     VYVANSE 30 MG capsule     No current facility-administered medications for this visit.       ALL:   No Known Allergies    FMH:  No family history on file.    SocHx:    Social History     Socioeconomic History     Marital status: Single     Spouse name: Not on file     Number of children: Not on file     Years of education: Not on file     Highest education level: Not on file   Occupational History     Not on file   Tobacco Use     Smoking status: Former     Types: Cigarettes     Smokeless " tobacco: Never   Substance and Sexual Activity     Alcohol use: Not on file     Drug use: Not on file     Sexual activity: Not on file   Other Topics Concern     Not on file   Social History Narrative     Not on file     Social Determinants of Health     Financial Resource Strain: Not on file   Food Insecurity: Not on file   Transportation Needs: Not on file   Physical Activity: Not on file   Stress: Not on file   Social Connections: Not on file   Intimate Partner Violence: Not on file   Housing Stability: Not on file           EXAMINATION:  Gen:   No apparent distress  Neuro:   A&Ox3, no deficits  Psych:    Answering questions appropriately for age and situation with normal affect  Head:    NCAT  Eye:    Visual scanning without deficit  Ear:    Response to auditory stimuli wnl  Lung:    Non-labored breathing on RA noted  Abd:    NTND per patient report  Lymph:    Neg for pitting/non-pitting edema BLE  Vasc:    Pulses palpable, CFT minimally delayed  Neuro:    Light touch sensation intact to all sensory nerve distributions without paresthesias  Derm:    Neg for nodules, lesions or ulcerations  MSK:    Left lower extremity -the exam is somewhat ambiguous today.  He initially had some discomfort on palpation of the fifth metatarsal base but this was otherwise unremarkable.  There is no pain along the peroneal tendons from the fifth metatarsal base to the lateral ankle.  He does have some discomfort on palpation of the plantar lateral midfoot as well as the plantar arch, along the course of the peroneus longus tendon, although there is minimal discomfort and no weakness with resisted first ray plantarflexion.  There is no tenderness on palpation of the central band of the plantar fascia.  Calf:    Neg for redness, swelling or tenderness      Assessment:  32 year old male with left arch pain along the course of the peroneus longus tendon      Medical Decision Making/Plan:  Discussed etiologies, anatomy and options  1.   Left arch pain along the course of the peroneus longus tendon  -I personally reviewed and interpreted the patient's lower extremity history pertinent to today's visit, including imaging/labs, in preparation for initiating a treatment program.  -Our tendinitis handout was dispensed  -I advised comfortable shoes and minimizing shoeless walking  -OTC insert for arch support and stress relief along plantar soft tissues  -RICE/NSAID versus Tylenol as needed based on pain; we specifically discussed the importance of modifying activities based on pain  -We discussed the options for a walking boot, prescription oral, prescription topical medication.  The patient declined today, stating he would like to try the home therapies first.  -Follow-up in 3 weeks, consider tier 2 options if the above plan has failed to provide sufficient relief    Follow up:  3 weeks or sooner with acute issues      Patient's medical history was reviewed today      Ar Gold DPM FACFAS FACFAOM  Podiatric Foot & Ankle Surgeon  [facility]  [phone]    Disclaimer: This note consists of symbols derived from keyboarding, dictation and/or voice recognition software. As a result, there may be errors in the script that have gone undetected. Please consider this when interpreting information found in this chart.            Again, thank you for allowing me to participate in the care of your patient.        Sincerely,        Ar Gold DPM, VAISHALI

## 2023-01-26 NOTE — PROGRESS NOTES
"Foot & Ankle Surgery  January 26, 2023    CC: \"mid-foot pain\"    I was asked to see Antonio Mortensen regarding the chief complaint by:  self    HPI:  Pt is a 32 year old male who presents with above complaint.  2-day history of left foot pain.  He describes burning and shooting pain, 7 out of 10 with \"any pressure on foot\".  This is worse with \"standing and walking\".  \"Nothing\" for treatment.  The patient went cross-country skiing on Sunday.  He does not recall any specific injury and did not have any \"immediate pain\".  He states it started feeling \"a little weird\", especially with going up and down stairs and is now having sharp pain in the foot.  He points to the plantar lateral left midfoot and forefoot and states it \"wraps around\" the side of the foot.    ROS:   Pos for CC.  The patient denies current nausea, vomiting, chills, fevers, belly pain, calf pain, chest pain or SOB.  Complete remainder of ROS is otherwise neg.    VITALS:    Vitals:    01/26/23 1520   BP: 118/70   Weight: 102.1 kg (225 lb)   Height: 1.854 m (6' 1\")       PMH:  No past medical history on file.    SXHX:  No past surgical history on file.     MEDS:    Current Outpatient Medications   Medication     VYVANSE 30 MG capsule     No current facility-administered medications for this visit.       ALL:   No Known Allergies    FMH:  No family history on file.    SocHx:    Social History     Socioeconomic History     Marital status: Single     Spouse name: Not on file     Number of children: Not on file     Years of education: Not on file     Highest education level: Not on file   Occupational History     Not on file   Tobacco Use     Smoking status: Former     Types: Cigarettes     Smokeless tobacco: Never   Substance and Sexual Activity     Alcohol use: Not on file     Drug use: Not on file     Sexual activity: Not on file   Other Topics Concern     Not on file   Social History Narrative     Not on file     Social Determinants of Health "     Financial Resource Strain: Not on file   Food Insecurity: Not on file   Transportation Needs: Not on file   Physical Activity: Not on file   Stress: Not on file   Social Connections: Not on file   Intimate Partner Violence: Not on file   Housing Stability: Not on file           EXAMINATION:  Gen:   No apparent distress  Neuro:   A&Ox3, no deficits  Psych:    Answering questions appropriately for age and situation with normal affect  Head:    NCAT  Eye:    Visual scanning without deficit  Ear:    Response to auditory stimuli wnl  Lung:    Non-labored breathing on RA noted  Abd:    NTND per patient report  Lymph:    Neg for pitting/non-pitting edema BLE  Vasc:    Pulses palpable, CFT minimally delayed  Neuro:    Light touch sensation intact to all sensory nerve distributions without paresthesias  Derm:    Neg for nodules, lesions or ulcerations  MSK:    Left lower extremity -the exam is somewhat ambiguous today.  He initially had some discomfort on palpation of the fifth metatarsal base but this was otherwise unremarkable.  There is no pain along the peroneal tendons from the fifth metatarsal base to the lateral ankle.  He does have some discomfort on palpation of the plantar lateral midfoot as well as the plantar arch, along the course of the peroneus longus tendon, although there is minimal discomfort and no weakness with resisted first ray plantarflexion.  There is no tenderness on palpation of the central band of the plantar fascia.  Calf:    Neg for redness, swelling or tenderness      Assessment:  32 year old male with left arch pain along the course of the peroneus longus tendon      Medical Decision Making/Plan:  Discussed etiologies, anatomy and options  1.  Left arch pain along the course of the peroneus longus tendon  -I personally reviewed and interpreted the patient's lower extremity history pertinent to today's visit, including imaging/labs, in preparation for initiating a treatment program.  -Our  tendinitis handout was dispensed  -I advised comfortable shoes and minimizing shoeless walking  -OTC insert for arch support and stress relief along plantar soft tissues  -RICE/NSAID versus Tylenol as needed based on pain; we specifically discussed the importance of modifying activities based on pain  -We discussed the options for a walking boot, prescription oral, prescription topical medication.  The patient declined today, stating he would like to try the home therapies first.  -Follow-up in 3 weeks, consider tier 2 options if the above plan has failed to provide sufficient relief    Follow up:  3 weeks or sooner with acute issues      Patient's medical history was reviewed today      Ar Gold DPM FACFAS FACFAOM  Podiatric Foot & Ankle Surgeon  [facility]  [phone]    Disclaimer: This note consists of symbols derived from keyboarding, dictation and/or voice recognition software. As a result, there may be errors in the script that have gone undetected. Please consider this when interpreting information found in this chart.

## 2023-01-26 NOTE — PATIENT INSTRUCTIONS
Thank you for choosing [facility] / Foot & Ankle Surgery!    DR. SMALLS'S CLINIC LOCATIONS:     [facility] (Friday) TRIAGE LINE: [phone] [address] [facility]  APPOINTMENTS: [phone]   [place] [address] RADIOLOGY: [phone]    PHYSICAL THERAPY: [phone]    SET UP SURGERY: [phone]   [place] (Mon-Tues AM-Thurs) BILLING QUESTIONS: [phone] [address] FAX: [phone]   [place] [address]           TENDONITIS   Tendons are the strong fibrous portions of muscles that attach to bones and allow the muscle to move a joint when it contracts. Tendons are very strong because they have a lot of force exerted on them. Sometimes tendons can become painful because they have suffered an acute injury, in which too much force was exerted at one time, or an overuse injury, in which a normal force was exerted too frequently or over a prolonged period of time. As a result, there is damage to the tendon and its surrounding soft tissue structures and they become inflammed. Because tendons do not have a great blood supply, they do not heal rapidly and the inflammation can become chronic.   Conservative treatment for tendinitis involves rest and anti-inflammatory measures. Ice is applied 15 minutes 2-3 times daily. Anti-inflammatory medications called NSAIDs (ibuprofen, example) can be taken provided they are used with caution, as they can lead to internal bleeding and increase the risk ofstroke and heart attack. Sometimes topical nitroglycerin is prescribed to help with pain. Often your doctor will use a special shoe or removable walking cast to immobilize the tendon, allowing it to heal without further damage from use. These devices are very useful in helping tendons heal, but they may slow you down or make you feel like your hip, knee, or back are out ofalignment. This is temporary and should go away once you are out ofthe immobilization. You should not use a  walking cast when showering or driving. Another option is Platelet Rich Plasma injections. (Normally done with a Sports and Orthorapedic doctor.   If conservative measures fail, your physician may need to surgically repair the tendon by removing any chronic inflammatory tissue and sewing it back together. Sometimes it is sewn to an adjacent tendon with similar function for support and sometimes it is lengthened. . Sometimes the bones around the tendon need to be realigned or reshaped to better support the tendon or prevent further damage. Your foot and ankle surgeon will discuss the specifics of your surgery with you, should you need it.    Towel stretch: Sit on a hard surface with your injured leg stretched out in front of you. Loop a towel around your toes and the ball of your foot and pull the towel toward your body keeping your leg straight. Hold this position for 15 to 30 seconds and then relax. Repeat 3 times. Then push the towel away with the ball of your foot. Repeat 3 times.  When you don't feel much of a stretch using the towel, you can start the standing calf stretch and the following exercises.  Standing calf stretch: Stand facing a wall with your hands on the wall at about eye level. Keep your injured leg back with your heel on the floor. Keep the other leg forward with the knee bent. Turn your back foot slightly inward (as if you were pigeon-toed). Slowly lean into the wall until you feel a stretch in the back of your calf. Hold the stretch for 15 to 30 seconds. Return to the starting position. Repeat 3 times. Do this exercise several times each day.   Standing soleus stretch: Stand facing a wall with your hands on the wall at about chest height. Keep your injured leg back with your heel on the floor. Keep the other leg forward with the knee bent. Turn your back foot slightly inward (as if you were pigeon-toed). Bend your back knee slightly and gently lean into the wall until you feel a stretch in the  lower calf of your injured leg. Hold the stretch for 15 to 30 seconds. Return to the starting position. Repeat 3 times.   Achilles stretch: Stand with the ball of one foot on a stair. Reach for the step below with your heel until you feel a stretch in the arch of your foot. Hold this position for 15 to 30 seconds and then relax. Repeat 3 times.   Heel raise: Balance yourself while standing behind a chair or counter. Using the chair or counter as a support to help you, raise your body up onto your toes and hold for 5 seconds. Then slowly lower yourself down without holding onto the support. (It's OK to keep holding onto the support if you need to.) When this exercise becomes less painful, try lowering yourself down on the injured leg only. Repeat 15 times. Do 2 sets of 15. Rest 30 seconds between sets.   Step-up: Stand with the foot of your injured leg on a support 3 to 5 inches high (like a small step or block of wood). Keep your other foot flat on the floor. Shift your weight onto the injured leg on the support. Straighten your injured leg as the other leg comes off the floor. Return to the starting position by bending your injured leg and slowly lowering your uninjured leg back to the floor. Do 2 sets of 15.   Resisted ankle eversion: Sit with both legs stretched out in front of you, with your feet about a shoulder's width apart. Tie a loop in one end of elastic tubing. Put the foot of your injured leg through the loop so that the tubing goes around the arch of that foot and wraps around the outside of the other foot. Hold onto the other end of the tubing with your hand to provide tension. Turn the foot of your injured leg up and out. Make sure you keep your other foot still so that it will allow the tubing to stretch as you move the foot of your injured leg. Return to the starting position. Do 2 sets of 15.   Balance and reach exercises: Stand next to a chair with your injured leg farther from the chair. The  chair will provide support if you need it. Stand on the foot of your injured leg and bend your knee slightly. Try to raise the arch of this foot while keeping your big toe on the floor. Keep your foot in this position. With the hand that is farther away from the chair, reach forward in front of you by bending at the waist. Avoid bending your knee any more as you do this. Repeat this 10 times. To make the exercise more challenging, reach farther in front of you. Do 2 sets of 10.  the same position as above. While keeping your arch height, reach the hand that is farther away from the chair across your body toward the chair. The farther you reach, the more challenging the exercise. Do 2 sets of 10.   Resisted ankle eversion: Sit with both legs stretched out in front of you, with your feet about a shoulder's width apart. Tie a loop in one end of elastic tubing. Put the foot of your injured leg through the loop so that the tubing goes around the arch of that foot and wraps around the outside of the other foot. Hold onto the other end of the tubing with your hand to provide tension. Turn the foot of your injured leg up and out. Make sure you keep your other foot still so that it will allow the tubing to stretch as you move the foot of your injured leg. Return to the starting position. Do 2 sets of 15.   If you have access to a wobble board, do the following exercises:  Wobble board exercises:   Stand on a wobble board with your feet shoulder width apart. Rock the board forwards and backwards 30 times, then side to side 30 times. Hold on to a chair if you need support.   Rotate the wobble board around so that the edge of the board is in contact with the floor at all times. Do this 30 times in a clockwise and then a counterclockwise direction.   Balance on the wobble board for as long as you can without letting the edges touch the floor. Try to do this for 2 minutes without touching the floor.   Rotate the wobble  board in clockwise and counterclockwise circles, but do not let the edge of the board touch the floor.   When you have mastered exercises A through D, try repeating them while standing on just your injured leg.   After you are able to do these exercises on one leg, try to do them with your eyes closed. Make sure you have something nearby to support you in case you lose your balance.    PRICE THERAPY  Many aches and pains throughout the foot and ankle can be helped with many simple treatments. This is usually described as PRICE Therapy.      P - Protection - often times, inflammation/pain in the lower extremity is not able to improve simply because the areas involved are never allowed to rest. Every step we take can bother the problematic area. Protecting those areas is an important step in the healing process. This may involve a walking cast boot, a special insert/orthotic device, an ankle brace, or simply avoiding barefoot walking.    R - Rest - in addition to protecting the foot/ankle, resting is an important, but often times difficult, treatment option. Getting off your feet when they bother you, and specifically avoiding activities that cause pain/discomfort, are very beneficial to prevent, and treat, foot/ankle pain.      I - Ice - icing regularly can help to decrease inflammation and swelling in the foot, thus decreasing pain. Using an ice pack or a bag of frozen veggies works very well. Ice for 20 minutes multiple times per day as needed.  Do not place the ice directly on the skin as this can cause tissue damage.    C - Compression - using a compression wrap or an ACE wrap can help to decrease swelling, which can help to decrease pain. Wearing the wraps is generally not needed at night, but they should be worn on a regular basis when you are going to be on your feet for prolonged periods as gravity tends to pull fluids down to your feet/ankles.    E - Elevation - elevating your lower extremities multiple  times daily for 15-20 minutes can help to decrease swelling, which works well in decreasing pain levels.    NSAID/Tylenol - Anti-inflammatories like Aleve or ibuprofen, and/or a pain medication, such as Tylenol, can help to improve pain levels and get the issue resolved sooner rather than later. Anyone with liver issues should be careful with Tylenol, and anyone with high blood pressure or heart, stomach or kidney issues should be careful with anti-inflammatories. Please ask if you have questions about these medications, including dosage.    OVER THE COUNTER INSERTS    Most of these can be found at your local [employer], [employer], or online:    Accipiter Radareet   Sofsole Fit Thotz   Power Step   Walk-Fit ([employer])  *For heel pain* Arch Cradles  *For heel pain*       ** A good high quality over the counter insert should cost around $40-$50    ** Capsulitis/Metarasalgia - try adding a metatarsal pad on your inserts or find an insert with one built in

## 2025-05-20 ENCOUNTER — HOSPITAL ENCOUNTER (EMERGENCY)
Facility: CLINIC | Age: 35
Discharge: HOME | End: 2025-05-20
Attending: EMERGENCY MEDICINE | Admitting: EMERGENCY MEDICINE
Payer: COMMERCIAL

## 2025-05-20 ENCOUNTER — APPOINTMENT (OUTPATIENT)
Dept: CT IMAGING | Facility: CLINIC | Age: 35
End: 2025-05-20
Payer: COMMERCIAL

## 2025-05-20 VITALS
TEMPERATURE: 97.6 F | OXYGEN SATURATION: 100 % | HEIGHT: 74 IN | BODY MASS INDEX: 30.42 KG/M2 | RESPIRATION RATE: 18 BRPM | SYSTOLIC BLOOD PRESSURE: 140 MMHG | DIASTOLIC BLOOD PRESSURE: 74 MMHG | HEART RATE: 80 BPM | WEIGHT: 237 LBS

## 2025-05-20 DIAGNOSIS — R19.00 RETROPERITONEAL MASS: ICD-10-CM

## 2025-05-20 DIAGNOSIS — M54.50 ACUTE RIGHT-SIDED LOW BACK PAIN WITHOUT SCIATICA: ICD-10-CM

## 2025-05-20 LAB
ALBUMIN SERPL BCG-MCNC: 4.6 G/DL (ref 3.5–5.2)
ALP SERPL-CCNC: 85 U/L (ref 40–150)
ALT SERPL W P-5'-P-CCNC: 22 U/L (ref 0–70)
AST SERPL W P-5'-P-CCNC: 31 U/L (ref 0–45)
BILIRUB SERPL-MCNC: 0.9 MG/DL
BILIRUBIN DIRECT (ROCHE PRO & PURE): 0.38 MG/DL (ref 0–0.45)
HGB BLD-MCNC: 13.2 G/DL (ref 13.3–17.7)
HOLD SPECIMEN: NORMAL
LIPASE SERPL-CCNC: 23 U/L (ref 13–60)
MCV RBC AUTO: 88 FL (ref 78–100)
PROT SERPL-MCNC: 7.5 G/DL (ref 6.4–8.3)

## 2025-05-20 PROCEDURE — 83690 ASSAY OF LIPASE: CPT

## 2025-05-20 PROCEDURE — 36415 COLL VENOUS BLD VENIPUNCTURE: CPT

## 2025-05-20 PROCEDURE — 85018 HEMOGLOBIN: CPT

## 2025-05-20 PROCEDURE — 74174 CTA ABD&PLVS W/CONTRAST: CPT

## 2025-05-20 PROCEDURE — 250N000011 HC RX IP 250 OP 636

## 2025-05-20 PROCEDURE — 84075 ASSAY ALKALINE PHOSPHATASE: CPT

## 2025-05-20 PROCEDURE — 99285 EMERGENCY DEPT VISIT HI MDM: CPT | Mod: 25

## 2025-05-20 RX ORDER — IOPAMIDOL 755 MG/ML
90 INJECTION, SOLUTION INTRAVASCULAR ONCE
Status: COMPLETED | OUTPATIENT
Start: 2025-05-20 | End: 2025-05-20

## 2025-05-20 RX ADMIN — IOPAMIDOL 90 ML: 755 INJECTION, SOLUTION INTRAVENOUS at 19:13

## 2025-05-20 ASSESSMENT — COLUMBIA-SUICIDE SEVERITY RATING SCALE - C-SSRS
1. IN THE PAST MONTH, HAVE YOU WISHED YOU WERE DEAD OR WISHED YOU COULD GO TO SLEEP AND NOT WAKE UP?: NO
2. HAVE YOU ACTUALLY HAD ANY THOUGHTS OF KILLING YOURSELF IN THE PAST MONTH?: NO
6. HAVE YOU EVER DONE ANYTHING, STARTED TO DO ANYTHING, OR PREPARED TO DO ANYTHING TO END YOUR LIFE?: NO

## 2025-05-20 ASSESSMENT — ACTIVITIES OF DAILY LIVING (ADL)
ADLS_ACUITY_SCORE: 41
ADLS_ACUITY_SCORE: 41

## 2025-05-20 NOTE — ED PROVIDER NOTES
Emergency Department Midlevel Supervisory Note     I personally saw the patient and performed a substantive portion of the visit including all aspects of the medical decision making.    Impression:  1. Retroperitoneal mass    2. Acute right-sided low back pain without sciatica            MDM / ED Course:  35-year-old male was sent into the ED after he was found to have a possible aneurysm on a noncontrast abdominal CT scan earlier today after he presented to an outside facility for evaluation of abdominal pain and back pain.  The patient was hypertensive upon arrival to the ED.  He did not appear to be in any obvious distress or discomfort at the time of my evaluation.  On exam he did not have any reproducible abdominal or back tenderness to palpation there was no palpable or pulsatile mass noted on exam.     The outside CT scan results were reviewed.  The patient also had a CBC, BMP, and UA obtained all of which were nondiagnostic.    Hepatic panel labs and lipase were both reassuring here in the ED.    CT of the abdomen shows a 7 x 7 x 6 cm mass in the lower retroperitoneum.  The mass was not an aneurysm and there was concern that the mass was due to metastasis from the patient's previous testicular cancer.  There was also a 3 cm mass in the superior right hepatic lobe.    The patient was informed of the CT scan results.  Because he was not experiencing any significant pain or discomfort here in the ED because he was hemodynamically stable the patient was discharged home with instructions to follow-up with his oncologist to determine a further treatment plan.  The patient was instructed to return back to ED sooner for any worsening back pain, abdominal pain, or for any other new or concerning symptoms.    6:24 PM Michelle Cortes PA-C staffed patient with me. I agree with their assessment and plan of management, and I will see the patient.     I met with the patient to introduce myself, gather additional history,  "perform my initial exam, and discuss the plan.   PPE: Provider wore gloves, N95 mask, eye protection.    Brief HPI:     Antonio Mortensen is a 35 year old male who presents for evaluation of a \"dull ache\" 5/10 right sided back pain and RLQ/medial abdominal pain which sporadically becomes sharp. He went to urgent care today and everything was fine except for a CT which showed a potential 5-7 cm aneurysm at the aorta iliac bifurcation. He took Tylenol today and was given 3 Advil at urgent care which helped a little. States he was told to ask for a CT with IV.       I, Chris Jones, am serving as a scribe to document services personally performed by Dr. Jin Baker, based on my observations and the provider's statements to me.   I, Dr. Jin Baker, attest that Chris Jones was acting in a scribe capacity, has observed my performance of the services and has documented them in accordance with my direction.      Brief Physical Exam:  Constitutional:  Alert, in no acute distress  EYES: Conjunctivae clear  HENT:  Atraumatic, normocephalic  Respiratory:  Respirations even, unlabored, in no acute respiratory distress  Cardiovascular:  Regular rate and rhythm, good peripheral perfusion  GI: Soft, nondistended, nontender, no palpable masses, no rebound, no guarding.   Musculoskeletal:  No edema. No cyanosis. Range of motion major extremities intact.    Integument: Warm, Dry, No erythema, No rash.   Neurologic:  Alert & oriented, no focal deficits noted  Psych: Normal mood and affect         Labs and Imaging:  Results for orders placed or performed during the hospital encounter of 05/20/25   CTA Abdomen Pelvis with Contrast    Impression    IMPRESSION:  1.  No abdominal aortic aneurysm.  2.  Large 7.3 x 7.2 x 5.8 cm soft tissue mass in the lower retroperitoneum anterior to the IVC at the level of the aortic bifurcation. This abuts portions of the distal abdominal aorta and right common iliac artery does not represent an aneurysm. 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  3.  3 cm homogeneously hyperenhancing mass in the superior right hepatic lobe. This could be further characterized by a dynamic enhanced MRI of the liver.       Result Value Ref Range    Lipase 23 13 - 60 U/L   Hepatic panel   Result Value Ref Range    Protein Total 7.5 6.4 - 8.3 g/dL    Albumin 4.6 3.5 - 5.2 g/dL    Bilirubin Total 0.9 <=1.2 mg/dL    Alkaline Phosphatase 85 40 - 150 U/L    AST 31 0 - 45 U/L    ALT 22 0 - 70 U/L    Bilirubin Direct 0.38 0.00 - 0.45 mg/dL   Hemoglobin   Result Value Ref Range    Hemoglobin 13.2 (L) 13.3 - 17.7 g/dL    MCV 88 78 - 100 fL   Extra Blue Top Tube   Result Value Ref Range    Hold Specimen JIC      I have reviewed the relevant laboratory and radiology studies    Procedures:  I was present for the key portions of this procedure: none      Dr. Jin Baker  [facility] EMERGENCY ROOM  [address] [address] [phone]     Jin Baker,   05/20/25 8739

## 2025-05-20 NOTE — ED PROVIDER NOTES
"Emergency Department Encounter   NAME: Antonio Mortensen ; AGE: 35 year old male ; YOB: 1990 ; MRN: [id number] ; PCP: No Ref-Primary, Physician   ED PROVIDER: Michelle Cortes PA-C    Evaluation Date & Time:   No admission date for patient encounter.    CHIEF COMPLAINT:  Abdominal Pain and Back Pain      Impression and Plan   MDM: Antonio Mortensen is a 35 year old male who presents to the ED for evaluation of abdominal and back pain. This morning patient began to have \"dull ache\" 5/10 right sided back pain and RLQ/medial abdominal pain which sporadically becomes sharp. He went to urgent care today and everything was fine except for a CT which showed a potential 5-7 cm aneurysm at the aorta iliac bifurcation. He took Tylenol today and was given 3 Advil at urgent care which helped a little. States he was told to ask for a \"CT with IV\". He also reports feeling like he may have strained his back yesterday.    Vitals reviewed and patient is initially hypertensive to 168/118, but this decreases to 142/82.  He is afebrile, not tachycardic or tachypneic. On exam patient has some tenderness to palpation of the middle of his abdomen.  No pulsatile mass felt.  No CVA tenderness.  Initially concerned about his hypertension with the possibility of aneurysm, but this decreases on repeat which is reassuring. UA at urgent care is negative for blood and CT is negative for any nephrolithiasis that could be causing his pain.  CBC in urgent care negative for any leukocytosis or severe anemia to indicate significant blood loss.  Repeat hemoglobin is stable in the ER.  Lipase and hepatic panel are normal and do not indicate any pancreatitis or biliary pathology as a cause of his abdominal pain.  CTA ordered, which does not show any abdominal aortic aneurysm.  It does show a large 7.3 x 7.2 x 5.8 cm soft tissue mass in the lower retroperitoneum anterior to the IVC at the level of the aortic bifurcation and a 3 cm liver mass.  I " discussed these findings with the patient and recommended that he follow-up with his PCP and oncologist, as this may represent a cancerous mass, especially with his history of testicular cancer.  He states his PCP and oncologist are in [place], so I did refer him to a new PCP in the area.  I discussed that his back pain may be from a muscle strain, and this mass may be an incidental finding that is not causing his pain.  I recommended the patient take Tylenol or ibuprofen for his pain or over-the-counter lidocaine patches.  He verbalized understanding is okay for discharge.      ED COURSE:  6:19 PM I met and introduced myself to the patient. I gathered initial history and performed my physical exam. We discussed plan for initial workup.   6:25 PM I have staffed the patient with Dr. Jin Baker, ED DO, who has evaluated the patient and agrees with all aspects of today's care.   7:56 PM I rechecked and updated the patient on results.    8:03 PM I rechecked the patient and discussed results, discharge, follow up, and reasons to return to the ED.     At the conclusion of the encounter I discussed the results of all the tests and the disposition. The questions were answered. The patient or family acknowledged understanding and was agreeable with the care plan.    Medical Decision Making  Discharge. No recommendations on prescription strength medication(s). See documentation for any additional details.    MIPS (CTPE, Dental pain, Inf[place], Sinusitis, Asthma/COPD, Head Trauma): Not Applicable    SEPSIS: None        FINAL IMPRESSION:    ICD-10-CM    1. Retroperitoneal mass  R19.00 Primary Care Referral      2. Acute right-sided low back pain without sciatica  M54.50 Primary Care Referral            MEDICATIONS GIVEN IN THE EMERGENCY DEPARTMENT:  Medications   iopamidol (ISOVUE-370) solution 90 mL (90 mLs Intravenous $Given 5/20/25 1913)         NEW PRESCRIPTIONS STARTED AT TODAY'S ED VISIT:  Discharge Medication List as of  "5/20/2025  8:12 PM            HPI   Use of Intrepreter: N/A    Antonio Mortensen is a 35 year old male with a pertinent history of testicular cancer who presents to the ED for evaluation of abdominal pain and back pain.    This morning patient began to have \"dull ache\" 5/10 right sided back pain and RLQ/medial abdominal pain which sporadically becomes sharp. He went to urgent care today and everything was fine except for a CT which showed a potential 5-7 cm aneurysm at the aorta iliac bifurcation. He took Tylenol today and was given 3 Advil at urgent care which helped a little. States he was told to ask for a CT with IV.     He reports a feeling of a \"cyst\" behind his spine, but he is unsure if this is related. He was been walking more than usual at work but yesterday he actually sat more than normal and reports bad posture. Denies lightheadedness, chest pain, headache, vision changes, numbness/tingling, hematuria, or dysuria.     Chart Review:  - [facility] on 5/20/25 (today). CT Abdomen Pelvis Stone Protocol WO: Large hypodense mass noted adjacent to the aorta iliac bifurcation measuring approximately 6.6 x 6 x 7.1 cm which is not fully characterized due to lack of intravenous contrast. Recommend CT of the abdomen and pelvis with IV contrast for further characterization and assess whether represents a vascular lesion.       REVIEW OF SYSTEMS:  Pertinent positive and negative symptoms per HPI.       Medical History     History reviewed. No pertinent past medical history.    History reviewed. No pertinent surgical history.    History reviewed. No pertinent family history.    Social History     Tobacco Use    Smoking status: Former     Types: Cigarettes    Smokeless tobacco: Never       VYVANSE 30 MG capsule          Physical Exam     First Vitals:  Patient Vitals for the past 24 hrs:   BP Temp Temp src Pulse Resp SpO2 Height Weight   05/20/25 1945 (!) 140/74 -- -- 80 -- 100 % -- --   05/20/25 1930 (!) " "147/91 -- -- 87 -- 99 % -- --   05/20/25 1925 (!) 142/82 -- -- 80 -- 97 % -- --   05/20/25 1908 (!) 149/84 -- -- -- -- -- -- --   05/20/25 1845 (!) 147/90 -- -- -- -- -- -- --   05/20/25 1719 (!) 168/118 97.6  F (36.4  C) Temporal 96 18 100 % 1.88 m (6' 2\") 107.5 kg (237 lb)         PHYSICAL EXAM:   Physical Exam  Vitals reviewed.   Constitutional:       General: He is not in acute distress.     Appearance: Normal appearance. He is not toxic-appearing.   HENT:      Head: Atraumatic.   Eyes:      General: No scleral icterus.     Conjunctiva/sclera: Conjunctivae normal.   Cardiovascular:      Rate and Rhythm: Normal rate.      Heart sounds: Normal heart sounds.   Pulmonary:      Effort: Pulmonary effort is normal. No respiratory distress.      Breath sounds: Normal breath sounds.   Abdominal:      Palpations: Abdomen is soft.      Tenderness: There is abdominal tenderness in the periumbilical area. There is no right CVA tenderness, left CVA tenderness, guarding or rebound.   Musculoskeletal:         General: No deformity. Normal range of motion.      Cervical back: Neck supple.   Skin:     General: Skin is warm.   Neurological:      General: No focal deficit present.      Mental Status: He is alert and oriented to person, place, and time.             Results     LAB:  All pertinent labs reviewed and interpreted  Labs Ordered and Resulted from Time of ED Arrival to Time of ED Departure   HEMOGLOBIN - Abnormal       Result Value    Hemoglobin 13.2 (*)     MCV 88     LIPASE - Normal    Lipase 23     HEPATIC FUNCTION PANEL - Normal    Protein Total 7.5      Albumin 4.6      Bilirubin Total 0.9      Alkaline Phosphatase 85      AST 31      ALT 22      Bilirubin Direct 0.38         RADIOLOGY:  CTA Abdomen Pelvis with Contrast   Final Result   IMPRESSION:   1.  No abdominal aortic aneurysm.   2.  Large 7.3 x 7.2 x 5.8 cm soft tissue mass in the lower retroperitoneum anterior to the IVC at the level of the aortic bifurcation. " This abuts portions of the distal abdominal aorta and right common iliac artery does not represent an aneurysm. 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   3.  3 cm homogeneously hyperenhancing mass in the superior right hepatic lobe. This could be further characterized by a dynamic enhanced MRI of the liver.                PROCEDURES:  None      I, Lorne Ascencio, am serving as a scribe to document services personally performed by Michelle Cortes PA-C, based on my observation and the provider's statements to me. I, Michelle Cortes PA-C attest that Lorne Ascencio is acting in a scribe capacity, has observed my performance of the services and has documented them in accordance with my direction.       Michelle Cortes PA-C   Emergency Medicine   [facility] EMERGENCY ROOM      Michelle Cortes PA-C  05/20/25 7175

## 2025-05-20 NOTE — ED TRIAGE NOTES
Pt arrives to ED from  with c/o back and abdominal pain. Pt reports abdominal pain is worse when sitting. CT scan w/o contrast at  shows hypodense mass noted adjacent to the aorta iliac bifurcation. 6.6 x 6 x 7.1 cm. Hx of testicular cancer 2017.

## 2025-05-21 NOTE — DISCHARGE INSTRUCTIONS
Your emergency department evaluation today is reassuring. You can take Tylenol or ibuprofen as needed for your pain, and I also recommend over-the-counter lidocaine patches.  I have placed a primary care referral, so they should reach out to you to schedule an appointment within the next few days.  I recommend you reach out to your oncologist as well for further evaluation of the retroperitoneal mass seen on imaging today.

## 2025-05-22 ENCOUNTER — OFFICE VISIT (OUTPATIENT)
Dept: FAMILY MEDICINE | Facility: CLINIC | Age: 35
End: 2025-05-22
Payer: COMMERCIAL

## 2025-05-22 VITALS
WEIGHT: 238 LBS | HEART RATE: 79 BPM | RESPIRATION RATE: 22 BRPM | OXYGEN SATURATION: 98 % | SYSTOLIC BLOOD PRESSURE: 126 MMHG | DIASTOLIC BLOOD PRESSURE: 60 MMHG | TEMPERATURE: 97.6 F | BODY MASS INDEX: 30.56 KG/M2

## 2025-05-22 DIAGNOSIS — M54.50 ACUTE RIGHT-SIDED LOW BACK PAIN WITHOUT SCIATICA: ICD-10-CM

## 2025-05-22 DIAGNOSIS — Z85.47 HISTORY OF TESTICULAR CANCER: ICD-10-CM

## 2025-05-22 DIAGNOSIS — R19.00 RETROPERITONEAL MASS: Primary | ICD-10-CM

## 2025-05-22 RX ORDER — PREDNISONE 20 MG/1
40 TABLET ORAL DAILY
Qty: 10 TABLET | Refills: 0 | Status: SHIPPED | OUTPATIENT
Start: 2025-05-22 | End: 2025-05-27

## 2025-05-22 RX ORDER — DEXTROAMPHETAMINE SACCHARATE, AMPHETAMINE ASPARTATE MONOHYDRATE, DEXTROAMPHETAMINE SULFATE AND AMPHETAMINE SULFATE 6.25; 6.25; 6.25; 6.25 MG/1; MG/1; MG/1; MG/1
25 CAPSULE, EXTENDED RELEASE ORAL DAILY
COMMUNITY

## 2025-05-22 NOTE — PROGRESS NOTES
Assessment & Plan     Retroperitoneal mass  History of testicular cancer    Pleasant 35-year-old male here for acute lower back pain  He went to the urgent care for this on 5/20/2022 and they are concerned for a aneurysm of the aorta so you sent to the emergency department for CTA  CTA did not reveal any pathology of his back however a large retroperitoneal mass was found at the aortic bifurcation anterior to the IVC  There is no swelling of the legs leading me to believe there is compression of the IVC or aorta  Patient was diagnosed with testicular cancer in 2018 and is status post right orchiectomy  Most recent oncology note as below  There was no lesions in the current area  Suspect recently patient urologic oncology for possible metastasis.  He did have regions in his liver as well however those were there in 2018    .IMPRESSION:  1.  No abdominal aortic aneurysm.  2.  Large 7.3 x 7.2 x 5.8 cm soft tissue mass in the lower retroperitoneum anterior to the IVC at the level of the aortic bifurcation. This abuts portions of the distal abdominal aorta and right common iliac artery does not represent an aneurysm. 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  3.  3 cm homogeneously hyperenhancing mass in the superior right hepatic lobe. This could be further characterized by a dynamic enhanced MRI of the liver.    Most recent oncology note from Dr. Oliveira on care every    Chief Complaint: Follow up for testicular cancer    Oncologic History:  1. Mr. Mortensen is a 26 yo man who initially presented with a right testicular mass self palpated when taking shower. He was seen by Dr. Gipson in the office. Testicular US on 4/16/18 showed bilateral testicular microcalcifications. One 2.1 x 2.1 x 2.1 cm complex solid hypoechoic mass within the right testicle. Tumor markers on 4/16/18 all negative. AFP 2.2, hCG<1.2, . He underwent R orchiectomy on 4/17/18. 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 Pathologic stage T1a. CT A/P on 4/18/18: 2.1 cm region of abnormal enhancement centered within hepatic VII. Mild hepatosplenomegaly. MRI abdomen on 4/20/18 showed an indeterminate 2.2 cm lesion in the right hepatic lobe.   2. Saw me on 4/30/18. PET scan 5/9/18 was negative. After discussions re multiple options, he opted active surveillance.   3. CT A/P on 7/23/18 showed unchanged 2.2 cm liver lesion. CXR negative. Tumor markers negative.   - Primary Care Referral  - Adult Urology  Referral; Future    Acute right-sided low back pain without sciatica  Approximately 48-hour history of lower back pain  Starts as a dull pain and will right lower back that radiates around to the front near the umbilicus that is sharp pain  He has tried Tylenol and ibuprofen with minimal relief  Lidocaine patches have helped with some relief  Physical exam was positive for ropey paraspinal muscles in the lumbar region  I suspect that the patient is compensating subconsciously for the mass and this is causing muscle spasm insomnia start him on a muscle relaxer  Instructed not to drive until he knows how he reacts to this  Also started on steroids  Cauda equina precautions provided in after visit summary  - tiZANidine (ZANAFLEX) 4 MG tablet; Take 1 tablet (4 mg) by mouth 3 times daily for 10 days.  - predniSONE (DELTASONE) 20 MG tablet; Take 2 tablets (40 mg) by mouth daily for 5 days.        MED REC REQUIRED  Post Medication Reconciliation Status:     BMI  Estimated body mass index is 30.56 kg/m  as calculated from the following:    Height as of 5/20/25: 1.88 m (6' 2\").    Weight as of this encounter: 108 kg (238 lb).       Libertad Olvera is a 35 year old, presenting for the following health issues:  Hospital F/U (Went in for stomach and " back pain. Retroperitoneal [facility] on right [facility] - still experiencing lower right back pain, wondering if that's related to mass. Pain on top of belly button when laughing )      5/22/2025     8:21 AM   Additional Questions   Roomed by Jahaira CAPELLAN   Accompanied by SELF     HPI      Back Pain  DURATION: Pain just started on Tuesday. Went to urgent care about it.  They believed he had an aneurysm so they sent him to the emergency department for a CTA where he was found to have a mass.  They told him they did not believe the mass was causing the pain.  Starts as a  dull ache in the right lower back. will radiate around the front to where he it turns into a sharp pain. Goes around the umbilicus.   some nights he will have back pain that he feels is positional.  He has been standing more at work  ASSOCIATION SYMPTOMS: not much of an appetite the last 2 days. Testicular cancer in 2018 in [place] with orchiectomy.. Moved here in 2020. No incontinence. No numbness or tingling. No muscle cramps. No swelling. No new rashes acute metabolic encephalopathy.   RELIEF TRIALS: tylenol, ibuprofen - helped minimally. Was running a fever yesterday. Lidocaine patches help the past.          Objective    /60   Pulse 79   Temp 97.6  F (36.4  C) (Oral)   Resp 22   Wt 108 kg (238 lb)   SpO2 98%   BMI 30.56 kg/m    Body mass index is 30.56 kg/m .  Physical Exam  Vitals reviewed.   Constitutional:       Appearance: Normal appearance.   HENT:      Head: Normocephalic and atraumatic.      Right Ear: External ear normal.      Left Ear: External ear normal.      Nose: Nose normal.      Mouth/Throat:      Mouth: Mucous membranes are moist.   Eyes:      Extraocular Movements: Extraocular movements intact.      Pupils: Pupils are equal, round, and reactive to light.   Cardiovascular:      Rate and Rhythm: Normal rate and regular rhythm.      Heart sounds: Normal heart sounds.   Pulmonary:      Effort: Pulmonary effort is  normal.      Breath sounds: Normal breath sounds.   Abdominal:      General: Abdomen is flat. Bowel sounds are normal. There is no distension.      Palpations: Abdomen is soft. There is no mass.      Tenderness: There is no abdominal tenderness. There is no guarding.      Hernia: A hernia is present.   Musculoskeletal:      Cervical back: Normal range of motion and neck supple.      Comments: No edema  Straight leg test negative bilaterally  Ropey texture of the right lumbar paraspinal muscles   Skin:     General: Skin is warm and dry.   Neurological:      General: No focal deficit present.      Mental Status: He is alert.   Psychiatric:         Mood and Affect: Mood normal.         Behavior: Behavior normal.                    Signed Electronically by: Aquilino Baca DO

## 2025-05-22 NOTE — PATIENT INSTRUCTIONS
Thank you for seeing us today at [facility].    Very sorry to see Antonio MARILIA Toribionn hurting    I have sent 2 prescriptions to your pharmacy  Tizanidine is a muscle relaxer.  Do not drive until you know how you react to this medicine  Prednisone may make you jittery and agitated as well as hungry as it is a steroid  If you begin to have the inability to feel your scrotum, testicles, rectum or you are start having accidents of either stool or urine please go to the emergency department immediately      Sincerely,  Aquilino Baca DO, MS  [facility]  [phone]

## 2025-05-27 ENCOUNTER — PRE VISIT (OUTPATIENT)
Dept: ONCOLOGY | Facility: CLINIC | Age: 35
End: 2025-05-27
Payer: COMMERCIAL

## 2025-05-27 NOTE — TELEPHONE ENCOUNTER
Reason for visit: consult     Dx/Hx/Sx: The patient has a history of testicular cancer and had a right orchiectomy in 2018. The patient was sent to the ED on 5/20 with right-sided low back pain and a 7 x 7 x 6 cm mass was found in the lower retroperitoneum.     Records/imaging/labs/orders: CTA from 5/20    At rooming: standard    --  Jabari Li on 5/27/2025 at 8:36 AM

## 2025-05-27 NOTE — TELEPHONE ENCOUNTER
MEDICAL RECORDS REQUEST   [facility]  Urology Clinic  [address] [place] [address]  PHONE: [phone]  Fax: [phone]        FUTURE VISIT INFORMATION                                                   Antonio Mortensen, : 1990 scheduled for future visit at [facility]    APPOINTMENT INFORMATION:  Date: 2025  Provider:  Gabe Malone MD  Reason for Visit/Diagnosis: TESTICULAR CANCER    REFERRAL INFORMATION:  Referring provider:  Aquilino Baca DO in [facility]      RECORDS REQUESTED FOR VISIT                                                     NOTES  STATUS/DETAILS   OFFICE NOTE from referring provider  2025 -- Aquilino Baca DO in [facility]   OFFICE NOTE from other specialist  YES, 2025 -- Monik Patten MD @ [facility]  2018 -- Ahmet Gipson MD  @ [facility]     DISCHARGE REPORT from the ER  YES, 2025 -- [facility] ED   MEDICATION LIST  yes   LABS     URINALYSIS (UA)  no   IMAGES  yes, 2025 -- CTA ABD PELVIS     PRE-VISIT CHECKLIST      Joint diagnostic appointment coordinated correctly          (ensure right order & amount of time) Yes   RECORD COLLECTION COMPLETE Yes

## 2025-06-03 ENCOUNTER — PRE VISIT (OUTPATIENT)
Dept: UROLOGY | Facility: CLINIC | Age: 35
End: 2025-06-03

## 2025-06-03 ENCOUNTER — HOSPITAL ENCOUNTER (OUTPATIENT)
Dept: ULTRASOUND IMAGING | Facility: CLINIC | Age: 35
Discharge: HOME | End: 2025-06-03
Attending: INTERNAL MEDICINE
Payer: COMMERCIAL

## 2025-06-03 DIAGNOSIS — C62.90: ICD-10-CM

## 2025-06-03 PROCEDURE — 76870 US EXAM SCROTUM: CPT

## 2025-06-08 ENCOUNTER — HEALTH MAINTENANCE LETTER (OUTPATIENT)
Age: 35
End: 2025-06-08

## 2025-06-12 ENCOUNTER — TRANSCRIBE ORDERS (OUTPATIENT)
Dept: OTHER | Age: 35
End: 2025-06-12

## 2025-06-12 DIAGNOSIS — R16.0 LIVER MASS: Primary | ICD-10-CM

## 2025-06-12 DIAGNOSIS — C62.90: ICD-10-CM

## 2025-06-12 DIAGNOSIS — C62.90 PRIMARY MALIGNANT NEOPLASM OF TESTIS (H): ICD-10-CM

## 2025-06-12 DIAGNOSIS — C78.6: ICD-10-CM

## 2025-06-17 ENCOUNTER — TRANSFERRED RECORDS (OUTPATIENT)
Dept: HEALTH INFORMATION MANAGEMENT | Facility: CLINIC | Age: 35
End: 2025-06-17

## 2025-06-17 ENCOUNTER — OFFICE VISIT (OUTPATIENT)
Dept: AUDIOLOGY | Facility: CLINIC | Age: 35
End: 2025-06-17
Payer: COMMERCIAL

## 2025-06-17 DIAGNOSIS — C62.90 PRIMARY MALIGNANT NEOPLASM OF TESTIS (H): ICD-10-CM

## 2025-06-17 DIAGNOSIS — C78.6: ICD-10-CM

## 2025-06-17 DIAGNOSIS — C62.90: ICD-10-CM

## 2025-06-17 DIAGNOSIS — H93.13 TINNITUS OF BOTH EARS: Primary | ICD-10-CM

## 2025-06-17 DIAGNOSIS — R16.0 LIVER MASS: ICD-10-CM

## 2025-06-17 PROCEDURE — 92557 COMPREHENSIVE HEARING TEST: CPT | Performed by: AUDIOLOGIST

## 2025-06-17 PROCEDURE — 92588 EVOKED AUDITORY TST COMPLETE: CPT | Performed by: AUDIOLOGIST

## 2025-06-17 PROCEDURE — 92550 TYMPANOMETRY & REFLEX THRESH: CPT | Performed by: AUDIOLOGIST

## 2025-06-17 NOTE — PROGRESS NOTES
AUDIOLOGY REPORT    SUBJECTIVE:  Antonio Mortensen is a 35 year old male who was seen in the Audiology Clinic at the [facility] for audiologic evaluation, referred by Cherelle Gutierrez M.D. The patient reports a history of noise exposure with headphones and loud music.  He also notes constant tinnitus bilaterally which has been present since he was in his 20's. The patient denies bilateral otalgia, bilateral drainage, bilateral aural fullness, and family history of hearing loss. The patient notes difficulty with communication in a variety of listening situations. They were unaccompanied today.     OBJECTIVE:  Falls Risk Screening  Falls Risk Completed by: audiology  Have you fallen 2 or more times in the past year? No  Have you fallen and had an injury in the past year? No  Is the patient receiving Physical Therapy services? No  Fall Screen Comments:            Otoscopic exam indicated ears are clear of cerumen bilaterally     Pure Tone Thresholds assessed using conventional audiometry with good, reliability from 250-8000 Hz bilaterally using insert earphones and circumaural headphones     RIGHT:  normal     LEFT:   normal with 20 dBHL notch at 4 kHz.     High frequency audiometry from 9,000-20,000 Hz was performed, which is within aged norms through 11.2 kHz with decreased hearing re; age related norms above 12.5 kHz.     Distortion product otoacoustic emissions were performed from 500-67846 Hz and were present bilaterally.  10 kHz was borderline in the right ear, however deemed to be present    Tympanogram:    RIGHT: normal eardrum mobility    LEFT:  normal eardrum mobility    Reflexes (reported by stimulus ear):  RIGHT: Ipsilateral: present at normal levels  RIGHT: Contralateral: absent at frequencies tested  LEFT:   Ipsilateral: absent at frequencies tested  LEFT:   Contralateral: present at normal levels      Speech Reception Threshold:    RIGHT: 5 dB HL    LEFT:   5 dB HL  Word  Recognition Score:     RIGHT: 100% at 50 dB HL using NU-6 recorded word list.    LEFT:   100% at 50 dB HL using NU-6 recorded word list.      ASSESSMENT:   Minimal- moderate high frequency hearing loss was found today above 12.5 kHz bilaterally.                                                                                                   Today s results were discussed with the patient in detail.     PLAN:  Patient was counseled regarding hearing loss and impact on communication.  It is recommended that the patient return for testing based on physician protocol and recommendation.  He will alert his oncologist of subjective changes in hearing or tinnitus.  Please call this clinic with questions regarding these results or recommendations. All questions were fully answered.           Maribel Jaramillo.   Doctor of Audiology  License #[id number]

## 2025-06-20 ENCOUNTER — HOSPITAL ENCOUNTER (OUTPATIENT)
Dept: ULTRASOUND IMAGING | Facility: CLINIC | Age: 35
Discharge: HOME | End: 2025-06-20
Attending: INTERNAL MEDICINE | Admitting: INTERNAL MEDICINE
Payer: COMMERCIAL

## 2025-06-20 DIAGNOSIS — C62.90 PRIMARY MALIGNANT NEOPLASM OF TESTIS (H): ICD-10-CM

## 2025-06-20 DIAGNOSIS — C78.6: ICD-10-CM

## 2025-06-20 DIAGNOSIS — R16.0 LIVER MASS: ICD-10-CM

## 2025-06-20 DIAGNOSIS — C62.90: ICD-10-CM

## 2025-06-20 PROCEDURE — 76536 US EXAM OF HEAD AND NECK: CPT

## 2025-06-25 ENCOUNTER — MEDICAL CORRESPONDENCE (OUTPATIENT)
Dept: HEALTH INFORMATION MANAGEMENT | Facility: CLINIC | Age: 35
End: 2025-06-25
Payer: COMMERCIAL

## 2025-06-26 ENCOUNTER — MEDICAL CORRESPONDENCE (OUTPATIENT)
Dept: HEALTH INFORMATION MANAGEMENT | Facility: CLINIC | Age: 35
End: 2025-06-26
Payer: COMMERCIAL

## 2025-06-30 ENCOUNTER — TRANSFERRED RECORDS (OUTPATIENT)
Dept: HEALTH INFORMATION MANAGEMENT | Facility: CLINIC | Age: 35
End: 2025-06-30
Payer: COMMERCIAL

## 2025-07-02 ENCOUNTER — TRANSCRIBE ORDERS (OUTPATIENT)
Dept: OTHER | Age: 35
End: 2025-07-02

## 2025-07-02 DIAGNOSIS — C62.90: ICD-10-CM

## 2025-07-02 DIAGNOSIS — E04.2 MULTIPLE THYROID NODULES: Primary | ICD-10-CM

## 2025-07-03 ENCOUNTER — PATIENT OUTREACH (OUTPATIENT)
Dept: CARE COORDINATION | Facility: CLINIC | Age: 35
End: 2025-07-03
Payer: COMMERCIAL

## 2025-07-07 ENCOUNTER — PATIENT OUTREACH (OUTPATIENT)
Dept: CARE COORDINATION | Facility: CLINIC | Age: 35
End: 2025-07-07
Payer: COMMERCIAL

## 2025-08-05 ENCOUNTER — OFFICE VISIT (OUTPATIENT)
Dept: AUDIOLOGY | Facility: CLINIC | Age: 35
End: 2025-08-05
Payer: COMMERCIAL

## 2025-08-05 DIAGNOSIS — H90.42 SENSORINEURAL HEARING LOSS (SNHL) OF LEFT EAR WITH UNRESTRICTED HEARING OF RIGHT EAR: ICD-10-CM

## 2025-08-05 DIAGNOSIS — H93.13 TINNITUS OF BOTH EARS: Primary | ICD-10-CM

## 2025-08-05 PROCEDURE — 92557 COMPREHENSIVE HEARING TEST: CPT | Performed by: AUDIOLOGIST

## 2025-08-05 PROCEDURE — 92588 EVOKED AUDITORY TST COMPLETE: CPT | Performed by: AUDIOLOGIST

## 2025-08-05 PROCEDURE — 92550 TYMPANOMETRY & REFLEX THRESH: CPT | Performed by: AUDIOLOGIST

## 2025-08-11 ENCOUNTER — TRANSFERRED RECORDS (OUTPATIENT)
Dept: HEALTH INFORMATION MANAGEMENT | Facility: CLINIC | Age: 35
End: 2025-08-11
Payer: COMMERCIAL

## 2025-09-15 ENCOUNTER — PRE VISIT (OUTPATIENT)
Dept: ENDOCRINOLOGY | Facility: CLINIC | Age: 35
End: 2025-09-15

## (undated) DEVICE — SEE MEDLINE ITEM 157148

## (undated) DEVICE — SEE MEDLINE ITEM 146417

## (undated) DEVICE — TRAY MINOR GEN SURG

## (undated) DEVICE — GOWN SURGICAL X-LARGE

## (undated) DEVICE — DRAIN PENROSE XRAY 12 X 1/4 ST

## (undated) DEVICE — NDL HYPO REG 25G X 1 1/2

## (undated) DEVICE — CLIPPER BLADE MOD 4406 (CAREF)

## (undated) DEVICE — ADHESIVE DERMABOND ADVANCED

## (undated) DEVICE — SEE MEDLINE ITEM 152622